# Patient Record
Sex: MALE | Race: BLACK OR AFRICAN AMERICAN | NOT HISPANIC OR LATINO | Employment: UNEMPLOYED | URBAN - METROPOLITAN AREA
[De-identification: names, ages, dates, MRNs, and addresses within clinical notes are randomized per-mention and may not be internally consistent; named-entity substitution may affect disease eponyms.]

---

## 2023-06-29 LAB
ANION GAP IN SER/PLAS: NORMAL
BASOPHILS (10*3/UL) IN BLOOD BY AUTOMATED COUNT: NORMAL
BASOPHILS/100 LEUKOCYTES IN BLOOD BY AUTOMATED COUNT: NORMAL
CALCIUM (MG/DL) IN SER/PLAS: NORMAL
CARBON DIOXIDE, TOTAL (MMOL/L) IN SER/PLAS: NORMAL
CHLORIDE (MMOL/L) IN SER/PLAS: NORMAL
CREATININE (MG/DL) IN SER/PLAS: NORMAL
EOSINOPHILS (10*3/UL) IN BLOOD BY AUTOMATED COUNT: NORMAL
EOSINOPHILS/100 LEUKOCYTES IN BLOOD BY AUTOMATED COUNT: NORMAL
ERYTHROCYTE DISTRIBUTION WIDTH (RATIO) BY AUTOMATED COUNT: NORMAL
ERYTHROCYTE MEAN CORPUSCULAR HEMOGLOBIN CONCENTRATION (G/DL) BY AUTOMATED: NORMAL
ERYTHROCYTE MEAN CORPUSCULAR VOLUME (FL) BY AUTOMATED COUNT: NORMAL
ERYTHROCYTES (10*6/UL) IN BLOOD BY AUTOMATED COUNT: NORMAL
GFR FEMALE: NORMAL
GFR MALE: NORMAL
GLUCOSE (MG/DL) IN SER/PLAS: NORMAL
HEMATOCRIT (%) IN BLOOD BY AUTOMATED COUNT: NORMAL
HEMOGLOBIN (G/DL) IN BLOOD: NORMAL
IMMATURE GRANULOCYTES/100 LEUKOCYTES IN BLOOD BY AUTOMATED COUNT: NORMAL
LEUKOCYTES (10*3/UL) IN BLOOD BY AUTOMATED COUNT: NORMAL
LYMPHOCYTES (10*3/UL) IN BLOOD BY AUTOMATED COUNT: NORMAL
LYMPHOCYTES/100 LEUKOCYTES IN BLOOD BY AUTOMATED COUNT: NORMAL
MANUAL DIFFERENTIAL Y/N: NORMAL
MONOCYTES (10*3/UL) IN BLOOD BY AUTOMATED COUNT: NORMAL
MONOCYTES/100 LEUKOCYTES IN BLOOD BY AUTOMATED COUNT: NORMAL
NEUTROPHILS (10*3/UL) IN BLOOD BY AUTOMATED COUNT: NORMAL
NEUTROPHILS/100 LEUKOCYTES IN BLOOD BY AUTOMATED COUNT: NORMAL
NRBC (PER 100 WBCS) BY AUTOMATED COUNT: NORMAL
PLATELETS (10*3/UL) IN BLOOD AUTOMATED COUNT: NORMAL
POTASSIUM (MMOL/L) IN SER/PLAS: NORMAL
SODIUM (MMOL/L) IN SER/PLAS: NORMAL
UREA NITROGEN (MG/DL) IN SER/PLAS: NORMAL

## 2023-07-02 LAB
GRAM STAIN: ABNORMAL
TISSUE/WOUND CULTURE/SMEAR: ABNORMAL

## 2023-08-04 ENCOUNTER — HOSPITAL ENCOUNTER (OUTPATIENT)
Dept: DATA CONVERSION | Facility: HOSPITAL | Age: 58
Discharge: HOME | End: 2023-08-04
Payer: MEDICAID

## 2023-08-04 DIAGNOSIS — S31.109D UNSPECIFIED OPEN WOUND OF ABDOMINAL WALL, UNSPECIFIED QUADRANT WITHOUT PENETRATION INTO PERITONEAL CAVITY, SUBSEQUENT ENCOUNTER: ICD-10-CM

## 2023-08-04 LAB — ETHANOL (MG/DL) IN SER/PLAS: 122 MG/DL

## 2023-09-25 RX ORDER — SERTRALINE HYDROCHLORIDE 50 MG/1
TABLET, FILM COATED ORAL
Status: ON HOLD | COMMUNITY
Start: 2023-07-30 | End: 2024-02-03 | Stop reason: WASHOUT

## 2023-09-25 RX ORDER — OMEPRAZOLE 20 MG/1
CAPSULE, DELAYED RELEASE ORAL
Status: ON HOLD | COMMUNITY
Start: 2023-06-14 | End: 2024-02-03 | Stop reason: WASHOUT

## 2023-09-25 RX ORDER — MEGESTROL ACETATE 40 MG/ML
SUSPENSION ORAL
Status: ON HOLD | COMMUNITY
Start: 2023-02-21 | End: 2024-02-03 | Stop reason: WASHOUT

## 2023-09-25 RX ORDER — MIDODRINE HYDROCHLORIDE 10 MG/1
TABLET ORAL
Status: ON HOLD | COMMUNITY
Start: 2023-07-30 | End: 2024-02-03 | Stop reason: WASHOUT

## 2023-09-25 RX ORDER — LIDOCAINE 50 MG/G
PATCH TOPICAL
Status: ON HOLD | COMMUNITY
Start: 2023-07-27 | End: 2024-02-03 | Stop reason: WASHOUT

## 2023-09-25 RX ORDER — QUETIAPINE FUMARATE 25 MG/1
TABLET, FILM COATED ORAL
Status: ON HOLD | COMMUNITY
Start: 2023-03-24 | End: 2024-02-03 | Stop reason: WASHOUT

## 2023-09-25 RX ORDER — GABAPENTIN 100 MG/1
CAPSULE ORAL
Status: ON HOLD | COMMUNITY
Start: 2023-07-31 | End: 2024-02-03 | Stop reason: WASHOUT

## 2023-09-25 RX ORDER — OLANZAPINE 5 MG/1
10 TABLET ORAL NIGHTLY
Status: ON HOLD | COMMUNITY
Start: 2023-07-30 | End: 2024-02-03 | Stop reason: WASHOUT

## 2023-09-25 RX ORDER — TAMSULOSIN HYDROCHLORIDE 0.4 MG/1
CAPSULE ORAL
Status: ON HOLD | COMMUNITY
Start: 2023-07-06 | End: 2024-02-03 | Stop reason: WASHOUT

## 2023-09-25 RX ORDER — MIRTAZAPINE 7.5 MG/1
TABLET, FILM COATED ORAL
Status: ON HOLD | COMMUNITY
Start: 2023-07-25 | End: 2024-02-03 | Stop reason: WASHOUT

## 2023-09-25 RX ORDER — AMMONIUM LACTATE 12 G/100G
LOTION TOPICAL
Status: ON HOLD | COMMUNITY
Start: 2023-07-05 | End: 2024-02-03 | Stop reason: WASHOUT

## 2023-09-25 RX ORDER — SILDENAFIL 100 MG/1
100 TABLET, FILM COATED ORAL
Status: ON HOLD | COMMUNITY
Start: 2022-10-22 | End: 2024-02-03 | Stop reason: WASHOUT

## 2023-09-30 NOTE — H&P
History of Present Illness:   History Present Illness   Reason for surgery: Abdominal wound split thickness  skin graft   HPI:    Bjorn Cedeno is a 57 year old Male With history of COPD, lung nodules, bronchiectasis, prostate cancer and recently multiple admissions for abdominal pain/small  bowel obstruction.  Initially presented on January 12 with abdominal pain and was admitted to the acute care surgery service with plan for nonoperative management and NG tube decompression.  He left the hospital AMA prior to resolution of his bowel obstruction.   He returned 3 days later with worsening abdominal pain and was taken urgently to the operating room for exploratory laparotomy, lysis of adhesions, small bowel resection and primary anastomosis.  His recovery was complicated by hypotension requiring  ICU admission.  He again left AMA on January 19.  Unfortunately returned to the emergency department with acute worsening of his abdominal pain.  CT scan of the abdomen revealed free air. He was urgently taken to the OR on 1/20 for exploratory laparotomy,  abdominal washout, EGD. Patient had 800ml of old blood evacuated from abdomen, bowel appeared viable. Negative leak test of anastomosis. Pt. returned to OR on 1/28 for ex-lap, small bowel resection, washout, and lysis of adhesion. Pt. again back to OR  on 1/30 for wash out and wound vac placement. Pt. then returned once again to OR on 2/1 for wash out reanastomosis and temporary abdominal closure. Returned to OR on 2/3 for fascial closure with phasix mesh. Patient began having intermittent fevers with  no associated clinical symptoms. A repeat CT A/P was completed on 2/7 which showed no acute findings. At that time patient was also pan cultured. Blood cultures found to be growing coga negative staph likely d/t PICC line which was removed on 2/8. Corynebacterium  striatum also noted in blood cultures which could be a contaminant vs PICC line. Infectious Disease  consulted and patient being treated with IV Vancomycin with a stop date of 2/15.   Wound continued to be managed with wound vac therapy. Patient started on a diet for which he tolerated well however poor appetite and therefore started on supplements along with a appetite stimulant. Patient continued to have poor PO intake, a Dobbhoff  was placed for additional nutritional support. He tolerated goal tube feeds and was transitioned to nocturnal tube feeds. Patient's dobbhoff may be removed once taking in adequate nutritional requirements.   Patient developed intermittent fevers after PICC line was removed. ID reconsulted. Started on Zosyn. No source identified based on clinical exam or CT. Sacral wound reassessed and was debrided at bedside but no bony involvement. He remained afebrile on  Zosyn and patient to complete a course of antibiotics for 10 days. Prior to discharge, he was transitioned to Augmentin with an end date of 2/26. Patient is here today for split thickness skin grafting of his abdominal wound.    Allergies:        Allergies:  ·  ibuprofen : Unknown    Home Medication Review:   Home Medications Reviewed: yes     Impression/Procedure:   ·  Impression and Planned Procedure: Abdominal wound split thickness skin grafting       ERAS (Enhanced Recovery After Surgery)   ·  ERAS Patient: no       Physical Exam by System     Constitutional: awake/alert/oriented x3, no distress,  alert and cooperative   Eyes: EOMI, clear sclera   ENMT: mucous membranes moist, no apparent injury,  no lesions seen   Head/Neck: Neck supple, no apparent injury   Respiratory/Thorax: unlabored respirations, on room  air   Cardiovascular: RRR   Gastrointestinal: Nondistended, soft, non-tender.  superficial abdominal wound with red/pink healthy granulation tissue   Genitourinary: Voiding independently   Musculoskeletal: ROM intact, no joint swelling, normal  strength   Extremities: normal extremities, no cyanosis edema,  contusions or  wounds, no clubbing   Neurological: alert and oriented x3, intact senses,  motor, response and reflexes, normal strength   Psychological: Appropriate mood and behavior   Skin: Warm and dry, no lesions, no rashes     Consent:   COVID-19 Consent   ·  COVID-19 Risk Consent Surgeon has reviewed key risks related to the risk of tia COVID-19 and if they contract COVID-19 what the risks are.     Electronic Signatures for Addendum Section:   Osmel Montejo) (Signed Addendum 04-Aug-2023 13:33)   Patient reported that he drank beer in the morning before surgery. Blood alcohol test was done and results were above the consent-ability levels. Surgery therefore  will be rescheduled on another day.     Electronic Signatures:  Marilu Gonzales (APRN-CNP)  (Signed 04-Aug-2023 06:43)   Authored: History of Present Illness, Allergies, Home  Medication Review, Impression/Procedure, ERAS, Physical Exam, Consent, Note Completion      Last Updated: 04-Aug-2023 13:33 by Osmel Montejo)

## 2023-09-30 NOTE — H&P
History of Present Illness:   History Present Illness   Reason for surgery: Abdominal split thickness skin  grafting   HPI:    Bjorn Cedeno is a 57 year old Male With history of COPD, lung nodules, bronchiectasis, prostate cancer and recently multiple admissions for abdominal pain/small  bowel obstruction.  Initially presented on January 12 with abdominal pain and was admitted to the acute care surgery service with plan for nonoperative management and NG tube decompression.  He left the hospital AMA prior to resolution of his bowel obstruction.   He returned 3 days later with worsening abdominal pain and was taken urgently to the operating room for exploratory laparotomy, lysis of adhesions, small bowel resection and primary anastomosis.  His recovery was complicated by hypotension requiring  ICU admission.  He subsequently recovered appropriately and was transferred to the regular nursing floor.  He again left AMA on January 19.  Unfortunately returned to the emergency department with acute worsening of his abdominal pain.  CT scan of the  abdomen revealed free air. He was urgently taken to the OR on 1/20 for exploratory laparotomy, abdominal washout, EGD. Patient had 800ml of old blood evacuated from abdomen, bowel appeared viable. Negative leak test of anastomosis. Pt. returned to OR  on 1/28 for ex-lap, small bowel resection, washout, and lysis of adhesion. Pt. again back to OR on 1/30 for wash out and wound vac placement. Pt. then returned once again to OR on 2/1 for wash out reanastomosis and temporary abdominal closure. Returned  to OR on 2/3 for fascial closure with phasix mesh. Patient began having intermittent fevers with no associated clinical symptoms. A repeat CT A/P was completed on 2/7 which showed no acute findings. At that time patient was also pan cultured. Blood cultures  found to be growing coga negative staph likely d/t PICC line which was removed on 2/8. Corynebacterium striatum also  noted in blood cultures which could be a contaminant vs PICC line. Infectious Disease consulted and patient being treated with IV Vancomycin  with a stop date of 2/15.   Wound continued to be managed with wound vac therapy. Patient started on a diet for which he tolerated well however poor appetite and therefore started on supplements along with a appetite stimulant. Patient continued to have poor PO intake, a Dobbhoff  was placed for additional nutritional support. He tolerated goal tube feeds and was transitioned to nocturnal tube feeds. Patient's dobbhoff may be removed once taking in adequate nutritional requirements.   Patient developed intermittent fevers after PICC line was removed. ID reconsulted. Started on Zosyn. No source identified based on clinical exam or CT. Sacral wound reassessed and was debrided at bedside but no bony involvement. He remained afebrile on  Zosyn and patient to complete a course of antibiotics for 10 days. Prior to discharge, he was transitioned to Augmentin with an end date of 2/26. Patient is here today for split thickness skin grafting of his abdominal wound.    Past Medical History:        Surg History:   Shock: Onset Date: 16-Jan-2023    Allergies:        Allergies:  ·  ibuprofen : Unknown    Home Medication Review:   Home Medications Reviewed: yes     Impression/Procedure:   ·  Impression and Planned Procedure: Abdominal split thickness skin graft       ERAS (Enhanced Recovery After Surgery)   ·  ERAS Patient: no       Physical Exam by System     Constitutional: Well developed, awake/alert/oriented  x3, no distress, alert and cooperative   Eyes: PERRL, EOMI, clear sclera   ENMT: mucous membranes moist, no apparent injury,  no lesions seen   Head/Neck: Neck supple, no apparent injury, thyroid  without mass or tenderness, No JVD, trachea midline, no bruits   Respiratory/Thorax: unlabored respirations, on room  air   Cardiovascular: Regular, rate and rhythm, no murmurs,  2+ equal  pulses of the extremities, normal S 1and S 2   Gastrointestinal: Superficial abdominal wound   Genitourinary: Voiding independently   Musculoskeletal: ROM intact, no joint swelling, normal  strength   Extremities: normal extremities, no cyanosis edema,  contusions or wounds, no clubbing   Neurological: alert and oriented x3, intact senses,  motor, response and reflexes, normal strength   Psychological: Appropriate mood and behavior   Skin: Warm and dry, no lesions, no rashes     Consent:   COVID-19 Consent   ·  COVID-19 Risk Consent Surgeon has reviewed key risks related to the risk of tia COVID-19 and if they contract COVID-19 what the risks are.     Electronic Signatures for Addendum Section:   Osmel Montejo) (Signed Addendum 19-Jul-2023 10:24)   Patient ddi not show up for surgery and the his surgery was therefore canceled.     Electronic Signatures:  Blanca Ng (PA (PHYSICIAN))   (Signed 19-Jul-2023 06:53)   Authored: History of Present Illness, Past Medical History, Allergies, Home Medication Review, Impression/Procedure, Physical Exam, ERAS, Consent, Note Completion    Last Updated: 19-Jul-2023 10:25 by Osmel Montejo)

## 2023-09-30 NOTE — H&P
History of Present Illness:   History Present Illness:  Reason for surgery: Abdominal split thickness skin  grafting   HPI:    Bjorn Cedeno is a 57 year old Male With history of COPD, lung nodules, bronchiectasis, prostate cancer and recently multiple admissions for abdominal pain/small  bowel obstruction.  Initially presented on January 12 with abdominal pain and was admitted to the acute care surgery service with plan for nonoperative management and NG tube decompression.  He left the hospital AMA prior to resolution of his bowel obstruction.   He returned 3 days later with worsening abdominal pain and was taken urgently to the operating room for exploratory laparotomy, lysis of adhesions, small bowel resection and primary anastomosis.  His recovery was complicated by hypotension requiring  ICU admission.  He subsequently recovered appropriately and was transferred to the regular nursing floor.  He again left AMA on January 19.  Unfortunately returned to the emergency department with acute worsening of his abdominal pain.  CT scan of the  abdomen revealed free air. He was urgently taken to the OR on 1/20 for exploratory laparotomy, abdominal washout, EGD. Patient had 800ml of old blood evacuated from abdomen, bowel appeared viable. Negative leak test of anastomosis. Pt. returned to OR  on 1/28 for ex-lap, small bowel resection, washout, and lysis of adhesion. Pt. again back to OR on 1/30 for wash out and wound vac placement. Pt. then returned once again to OR on 2/1 for wash out reanastomosis and temporary abdominal closure. Returned  to OR on 2/3 for fascial closure with phasix mesh. Patient began having intermittent fevers with no associated clinical symptoms. A repeat CT A/P was completed on 2/7 which showed no acute findings. At that time patient was also pan cultured. Blood cultures  found to be growing coga negative staph likely d/t PICC line which was removed on 2/8. Corynebacterium striatum also  noted in blood cultures which could be a contaminant vs PICC line. Infectious Disease consulted and patient being treated with IV Vancomycin  with a stop date of 2/15.   Wound continued to be managed with wound vac therapy. Patient started on a diet for which he tolerated well however poor appetite and therefore started on supplements along with a appetite stimulant. Patient continued to have poor PO intake, a Dobbhoff  was placed for additional nutritional support. He tolerated goal tube feeds and was transitioned to nocturnal tube feeds. Patient's dobbhoff may be removed once taking in adequate nutritional requirements.   Patient developed intermittent fevers after PICC line was removed. ID reconsulted. Started on Zosyn. No source identified based on clinical exam or CT. Sacral wound reassessed and was debrided at bedside but no bony involvement. He remained afebrile on  Zosyn and patient to complete a course of antibiotics for 10 days. Prior to discharge, he was transitioned to Augmentin with an end date of 2/26. Patient is here today for split thickness skin grafting of his abdominal wound.    Allergies:        Allergies:  ·  ibuprofen : Unknown    Home Medication Review:   Home Medications Reviewed: yes     Impression/Procedure:   ·  Impression and Planned Procedure: Abdominal split thickness skin graft       ERAS (Enhanced Recovery After Surgery):  ·  ERAS Patient: no       Physical Exam by System:    Constitutional: awake/alert/oriented x3, no distress,  alert and cooperative   Eyes: EOMI, clear sclera   ENMT: mucous membranes moist, no apparent injury,  no lesions seen   Head/Neck: Neck supple, no apparent injury   Respiratory/Thorax: unlabored respirations, on room  air   Cardiovascular: Regular, rate and rhythm   Gastrointestinal: Superficial abdominal wound   Genitourinary: Voiding independently   Musculoskeletal: ROM intact, no joint swelling, normal  strength   Extremities: normal extremities, no  cyanosis edema,  contusions or wounds, no clubbing   Neurological: alert and oriented x3, intact senses,  motor, response and reflexes, normal strength   Psychological: Appropriate mood and behavior   Skin: Warm and dry, no lesions, no rashes     Consent:   COVID-19 Consent:  ·  COVID-19 Risk Consent Surgeon has reviewed key risks related to the risk of tia COVID-19 and if they contract COVID-19 what the risks are.       Electronic Signatures:  Marilu Gonzales (APRN-CNP)  (Signed 20-Jun-2023 06:03)   Authored: History of Present Illness, Allergies, Home  Medication Review, Impression/Procedure, ERAS, Physical Exam, Consent, Note Completion      Last Updated: 20-Jun-2023 06:03 by Marilu Gonzales (APRN-CNP)

## 2023-10-02 ENCOUNTER — APPOINTMENT (OUTPATIENT)
Dept: PLASTIC SURGERY | Facility: CLINIC | Age: 58
End: 2023-10-02
Payer: MEDICAID

## 2023-12-19 ENCOUNTER — CLINICAL SUPPORT (OUTPATIENT)
Dept: SURGERY | Facility: CLINIC | Age: 58
End: 2023-12-19
Payer: MEDICAID

## 2023-12-19 VITALS
SYSTOLIC BLOOD PRESSURE: 137 MMHG | BODY MASS INDEX: 22.07 KG/M2 | RESPIRATION RATE: 16 BRPM | DIASTOLIC BLOOD PRESSURE: 92 MMHG | HEART RATE: 82 BPM | WEIGHT: 172 LBS | HEIGHT: 74 IN

## 2023-12-19 DIAGNOSIS — Z51.89 ENCOUNTER FOR WOUND CARE: ICD-10-CM

## 2023-12-19 DIAGNOSIS — K56.609 SBO (SMALL BOWEL OBSTRUCTION) (MULTI): Primary | ICD-10-CM

## 2023-12-19 PROCEDURE — 99215 OFFICE O/P EST HI 40 MIN: CPT | Performed by: PHYSICIAN ASSISTANT
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
## 2024-01-08 ENCOUNTER — HOSPITAL ENCOUNTER (EMERGENCY)
Facility: HOSPITAL | Age: 59
Discharge: HOME | End: 2024-01-08
Attending: EMERGENCY MEDICINE
Payer: MEDICAID

## 2024-01-08 VITALS
HEART RATE: 86 BPM | OXYGEN SATURATION: 98 % | TEMPERATURE: 97.2 F | SYSTOLIC BLOOD PRESSURE: 132 MMHG | RESPIRATION RATE: 16 BRPM | DIASTOLIC BLOOD PRESSURE: 92 MMHG

## 2024-01-08 DIAGNOSIS — T14.8XXA SURGICAL WOUND PRESENT: Primary | ICD-10-CM

## 2024-01-08 LAB
ALBUMIN SERPL BCP-MCNC: 4.2 G/DL (ref 3.4–5)
ALP SERPL-CCNC: 62 U/L (ref 33–136)
ALT SERPL W P-5'-P-CCNC: 40 U/L (ref 10–52)
ANION GAP SERPL CALC-SCNC: 11 MMOL/L (ref 10–20)
AST SERPL W P-5'-P-CCNC: 45 U/L (ref 9–39)
BASOPHILS # BLD AUTO: 0.04 X10*3/UL (ref 0–0.1)
BASOPHILS NFR BLD AUTO: 1 %
BILIRUB SERPL-MCNC: 0.3 MG/DL (ref 0–1.2)
BUN SERPL-MCNC: 15 MG/DL (ref 6–23)
CALCIUM SERPL-MCNC: 9.3 MG/DL (ref 8.6–10.6)
CHLORIDE SERPL-SCNC: 105 MMOL/L (ref 98–107)
CO2 SERPL-SCNC: 27 MMOL/L (ref 21–32)
CREAT SERPL-MCNC: 1.09 MG/DL (ref 0.5–1.3)
EGFRCR SERPLBLD CKD-EPI 2021: 78 ML/MIN/1.73M*2
EOSINOPHIL # BLD AUTO: 0.18 X10*3/UL (ref 0–0.7)
EOSINOPHIL NFR BLD AUTO: 4.6 %
ERYTHROCYTE [DISTWIDTH] IN BLOOD BY AUTOMATED COUNT: 11.7 % (ref 11.5–14.5)
GLUCOSE SERPL-MCNC: 109 MG/DL (ref 74–99)
HCT VFR BLD AUTO: 43.1 % (ref 41–52)
HGB BLD-MCNC: 15 G/DL (ref 13.5–17.5)
IMM GRANULOCYTES # BLD AUTO: 0.02 X10*3/UL (ref 0–0.7)
IMM GRANULOCYTES NFR BLD AUTO: 0.5 % (ref 0–0.9)
LYMPHOCYTES # BLD AUTO: 1.21 X10*3/UL (ref 1.2–4.8)
LYMPHOCYTES NFR BLD AUTO: 30.7 %
MCH RBC QN AUTO: 29.8 PG (ref 26–34)
MCHC RBC AUTO-ENTMCNC: 34.8 G/DL (ref 32–36)
MCV RBC AUTO: 86 FL (ref 80–100)
MONOCYTES # BLD AUTO: 0.52 X10*3/UL (ref 0.1–1)
MONOCYTES NFR BLD AUTO: 13.2 %
NEUTROPHILS # BLD AUTO: 1.97 X10*3/UL (ref 1.2–7.7)
NEUTROPHILS NFR BLD AUTO: 50 %
NRBC BLD-RTO: 0 /100 WBCS (ref 0–0)
PLATELET # BLD AUTO: 189 X10*3/UL (ref 150–450)
POTASSIUM SERPL-SCNC: 3.9 MMOL/L (ref 3.5–5.3)
PROT SERPL-MCNC: 7.9 G/DL (ref 6.4–8.2)
RBC # BLD AUTO: 5.04 X10*6/UL (ref 4.5–5.9)
SODIUM SERPL-SCNC: 139 MMOL/L (ref 136–145)
WBC # BLD AUTO: 3.9 X10*3/UL (ref 4.4–11.3)

## 2024-01-08 PROCEDURE — 84075 ASSAY ALKALINE PHOSPHATASE: CPT | Performed by: STUDENT IN AN ORGANIZED HEALTH CARE EDUCATION/TRAINING PROGRAM

## 2024-01-08 PROCEDURE — 85025 COMPLETE CBC W/AUTO DIFF WBC: CPT | Performed by: EMERGENCY MEDICINE

## 2024-01-08 PROCEDURE — 99283 EMERGENCY DEPT VISIT LOW MDM: CPT

## 2024-01-08 PROCEDURE — 80053 COMPREHEN METABOLIC PANEL: CPT | Performed by: EMERGENCY MEDICINE

## 2024-01-08 PROCEDURE — 99284 EMERGENCY DEPT VISIT MOD MDM: CPT | Performed by: EMERGENCY MEDICINE

## 2024-01-08 PROCEDURE — 85025 COMPLETE CBC W/AUTO DIFF WBC: CPT | Performed by: STUDENT IN AN ORGANIZED HEALTH CARE EDUCATION/TRAINING PROGRAM

## 2024-01-08 ASSESSMENT — COLUMBIA-SUICIDE SEVERITY RATING SCALE - C-SSRS
2. HAVE YOU ACTUALLY HAD ANY THOUGHTS OF KILLING YOURSELF?: NO
6. HAVE YOU EVER DONE ANYTHING, STARTED TO DO ANYTHING, OR PREPARED TO DO ANYTHING TO END YOUR LIFE?: NO
1. IN THE PAST MONTH, HAVE YOU WISHED YOU WERE DEAD OR WISHED YOU COULD GO TO SLEEP AND NOT WAKE UP?: NO

## 2024-01-08 NOTE — ED TRIAGE NOTES
Pt says he had abd hernia sx 1 year ago, wound still open, recently incarcerated for 4 months, hasn't had follow up

## 2024-01-08 NOTE — DISCHARGE INSTRUCTIONS
Contact the [facility] Today  For an appointment with one of our wound treatment specialists, call [phone].    WOUND CARE  - Take daily showers  - Allow warm, soapy water to wash over wound  - Do not scrub at the wound  - When out of the shower, gently pat the wound dry.  - Do not apply lotions, ointments or creams  - Avoid soaking in bodies of water (bathtub, hot tubs, pools, lakes, etc) until wound is completely healed  - continue to apply xeroform over the top two open portions of the wound to help with sticking to dressing   - no concerns of infection or fistula at this time   - continue to cleanse the wound with normal saline  - please encourage patient to shower daily   - after showering please allow wound to dry completely, okay to stay open to air until you see wound care the next day   - The wound needs to be open to air without abdominal binder, wound dressings, ect. For at least a few hours a day

## 2024-01-08 NOTE — ED PROVIDER NOTES
"HPI   Chief Complaint   Patient presents with    Wound Check       Patient is a 58-year-old male with past medical history of SBO (s/p ex lap with subsequent anastomosis, wound washout, mesh placement and abdominal closure in Jan-Feb 2023), reported prostate cancer being treated with reported low-dose brachytherapy (no documented treatment but patient describes placement of 50 \"helen\" in his prostate) presenting with concern for continued weeping from abdominal wound.  Patient endorses he was getting adequate wound care while he was incarcerated recently but since discharging to [facility] he has not had his wound care cream or adequate sterile dressings.  Patient concerned that wound has continued to weep clear to serosanguineous fluid.  Patient endorses he was scheduled to have surgical repair of the wound with grafting on a follow-up appointment but has not had this performed yet.  Patient otherwise does not endorse warmth to the wound and denies fevers, chills, malaise, cough, congestion, rhinorrhea or other infectious symptoms.  Patient endorses he is still having regular bowel movements but does endorse occasionally using laxative.  Patient otherwise endorses urination is still complicated by his prostate treatment but endorses no recent changes.                          No data recorded                Patient History   No past medical history on file.  Past Surgical History:   Procedure Laterality Date    CT ANGIO NECK  8/13/2023    CT ANGIO NECK 8/13/2023     No family history on file.  Social History     Tobacco Use    Smoking status: Not on file    Smokeless tobacco: Not on file   Substance Use Topics    Alcohol use: Not on file    Drug use: Not on file       Physical Exam   ED Triage Vitals [01/08/24 1002]   Temp Heart Rate Resp BP   36.2 °C (97.2 °F) 86 16 (!) 132/92      SpO2 Temp src Heart Rate Source Patient Position   98 % -- -- --      BP Location FiO2 (%)     -- --       Physical Exam  Vitals " and nursing note reviewed.   Constitutional:       General: He is not in acute distress.     Appearance: He is well-developed.   HENT:      Head: Normocephalic and atraumatic.   Eyes:      Conjunctiva/sclera: Conjunctivae normal.   Cardiovascular:      Rate and Rhythm: Normal rate and regular rhythm.      Heart sounds: No murmur heard.  Pulmonary:      Effort: Pulmonary effort is normal. No respiratory distress.      Breath sounds: Normal breath sounds.   Abdominal:      Palpations: Abdomen is soft.      Tenderness: There is no abdominal tenderness.   Musculoskeletal:         General: No swelling.      Cervical back: Neck supple.   Skin:     General: Skin is warm and dry.      Capillary Refill: Capillary refill takes less than 2 seconds.      Comments: 3 x 2 cm midline wound to abdomen.  Coloration is pinkish consistent with granulation tissue.  Scant soaking of dressing with serosanguineous fluid.  Significant scarring to surrounding area but no appreciable erythema or warmth.  Reported tenderness to palpation just inferior and lateral to patient's right of wound.  Palpable fibrotic tissue in the surrounding area but no obvious fluctuance or focal tenderness.    Additional area of abrasion in sacral region but no breakdown of skin, no warmth, no erythema   Neurological:      Mental Status: He is alert.   Psychiatric:         Mood and Affect: Mood normal.         ED Course & MDM   ED Course as of 01/08/24 1438   Mon Jan 08, 2024   1341 Patient presented to the emergency department with a chief concern of wound check.  Patient is 1 year out from hernia repair complicated by hypotension with prolonged SICU stay who is presenting to the emergency department for assistance with wound care.  On my physical examination the patient has a sacral pressure sore which appears to be well-healing, no signs of infection.  Additionally the patient has a surgical scar on the abdomen and with granulation tissue, no signs of  "purulence or infection.  Patient denies any recent fevers, denies any abdominal pain, nausea vomiting/diarrhea and reports that he has been having consistent bowel movements without difficulty.  Given patient's reassuring examination I do not believe that any CT imaging is warranted at this time.  Basic labs are obtained and patient appears well-nourished, no signs of infection, no leukocytosis.  Resident physician assist patient with instructions for wound care, gave patient follow-up with wound care, additionally gave patient instructions for follow-up with surgery clinic. [BN]      ED Course User Index  [BN] Yahir Hamm MD         Diagnoses as of 01/08/24 1438   Surgical wound present       Medical Decision Making  Patient is a 58-year-old male with past medical history of SBO (s/p ex lap with subsequent anastomosis, wound washout, mesh placement and abdominal closure in Jan-Feb 2023), reported prostate cancer being treated with reported low-dose brachytherapy (no documented treatment but patient describes placement of 50 \"helen\" in his prostate) presenting with concern for continued weeping from abdominal wound.  Wound site consistent with chronic wound with appropriate granulation tissue and no evidence of local or systemic infection.  Patient with white count of 3.9 with no neutropenia and only current cancer treatment being low-dose brachytherapy not felt to be immunosuppressive.  Patient does have mild tenderness but does not endorse complaint of abdominal pain and otherwise has generally benign abdominal exam and CT of abdomen deferred at this time.  Patient otherwise having regular bowel movements reducing concern for new obstruction.  Patient's wound dressed with Xeroform and abdominal pad in alignment with prior recommended treatment from [facility].  Patient provided instructions for wound care and was walked through these instructions.  Patient provided follow-up phone numbers for wound care, " general surgery and plastic surgery to coordinate future wound care and possible grafting procedure of the wound as previously discussed.  Return precautions discussed with patient and patient discharged home.    Patient seen and discussed with Dr. Hamm and Dr. Raine Lambert MD, PhD  Emergency Medicine PGY2          Procedure  Procedures     Pilo Lambert MD  Resident  01/08/24 0125

## 2024-02-02 ENCOUNTER — APPOINTMENT (OUTPATIENT)
Dept: RADIOLOGY | Facility: HOSPITAL | Age: 59
End: 2024-02-02
Payer: MEDICAID

## 2024-02-02 ENCOUNTER — HOSPITAL ENCOUNTER (OUTPATIENT)
Facility: HOSPITAL | Age: 59
Setting detail: OBSERVATION
Discharge: HOME | End: 2024-02-08
Attending: EMERGENCY MEDICINE | Admitting: STUDENT IN AN ORGANIZED HEALTH CARE EDUCATION/TRAINING PROGRAM
Payer: MEDICAID

## 2024-02-02 DIAGNOSIS — R42 VERTIGO: Primary | ICD-10-CM

## 2024-02-02 DIAGNOSIS — I10 PRIMARY HYPERTENSION: ICD-10-CM

## 2024-02-02 DIAGNOSIS — R93.89 ABNORMAL COMPUTED TOMOGRAPHY ANGIOGRAPHY (CTA) OF NECK: ICD-10-CM

## 2024-02-02 LAB
ALBUMIN SERPL BCP-MCNC: 4.4 G/DL (ref 3.4–5)
ALP SERPL-CCNC: 73 U/L (ref 33–136)
ALT SERPL W P-5'-P-CCNC: 18 U/L (ref 10–52)
ANION GAP SERPL CALC-SCNC: 11 MMOL/L (ref 10–20)
AST SERPL W P-5'-P-CCNC: 26 U/L (ref 9–39)
BASOPHILS # BLD AUTO: 0.02 X10*3/UL (ref 0–0.1)
BASOPHILS NFR BLD AUTO: 0.5 %
BILIRUB SERPL-MCNC: 0.5 MG/DL (ref 0–1.2)
BUN SERPL-MCNC: 12 MG/DL (ref 6–23)
CALCIUM SERPL-MCNC: 9.6 MG/DL (ref 8.6–10.6)
CHLORIDE SERPL-SCNC: 104 MMOL/L (ref 98–107)
CO2 SERPL-SCNC: 30 MMOL/L (ref 21–32)
CREAT SERPL-MCNC: 1.03 MG/DL (ref 0.5–1.3)
EGFRCR SERPLBLD CKD-EPI 2021: 83 ML/MIN/1.73M*2
EOSINOPHIL # BLD AUTO: 0.14 X10*3/UL (ref 0–0.7)
EOSINOPHIL NFR BLD AUTO: 3.8 %
ERYTHROCYTE [DISTWIDTH] IN BLOOD BY AUTOMATED COUNT: 11.8 % (ref 11.5–14.5)
GLUCOSE SERPL-MCNC: 86 MG/DL (ref 74–99)
HCT VFR BLD AUTO: 45.7 % (ref 41–52)
HGB BLD-MCNC: 16 G/DL (ref 13.5–17.5)
IMM GRANULOCYTES # BLD AUTO: 0.01 X10*3/UL (ref 0–0.7)
IMM GRANULOCYTES NFR BLD AUTO: 0.3 % (ref 0–0.9)
LYMPHOCYTES # BLD AUTO: 1.3 X10*3/UL (ref 1.2–4.8)
LYMPHOCYTES NFR BLD AUTO: 35 %
MCH RBC QN AUTO: 30.7 PG (ref 26–34)
MCHC RBC AUTO-ENTMCNC: 35 G/DL (ref 32–36)
MCV RBC AUTO: 88 FL (ref 80–100)
MONOCYTES # BLD AUTO: 0.46 X10*3/UL (ref 0.1–1)
MONOCYTES NFR BLD AUTO: 12.4 %
NEUTROPHILS # BLD AUTO: 1.78 X10*3/UL (ref 1.2–7.7)
NEUTROPHILS NFR BLD AUTO: 48 %
NRBC BLD-RTO: 0 /100 WBCS (ref 0–0)
PLATELET # BLD AUTO: 208 X10*3/UL (ref 150–450)
POTASSIUM SERPL-SCNC: 4.7 MMOL/L (ref 3.5–5.3)
PROT SERPL-MCNC: 8.3 G/DL (ref 6.4–8.2)
RBC # BLD AUTO: 5.21 X10*6/UL (ref 4.5–5.9)
SODIUM SERPL-SCNC: 140 MMOL/L (ref 136–145)
WBC # BLD AUTO: 3.7 X10*3/UL (ref 4.4–11.3)

## 2024-02-02 PROCEDURE — 99283 EMERGENCY DEPT VISIT LOW MDM: CPT | Performed by: PHYSICIAN ASSISTANT

## 2024-02-02 PROCEDURE — 85025 COMPLETE CBC W/AUTO DIFF WBC: CPT | Performed by: PHYSICIAN ASSISTANT

## 2024-02-02 PROCEDURE — 2500000001 HC RX 250 WO HCPCS SELF ADMINISTERED DRUGS (ALT 637 FOR MEDICARE OP): Performed by: STUDENT IN AN ORGANIZED HEALTH CARE EDUCATION/TRAINING PROGRAM

## 2024-02-02 PROCEDURE — 99221 1ST HOSP IP/OBS SF/LOW 40: CPT | Performed by: SURGERY

## 2024-02-02 PROCEDURE — 99285 EMERGENCY DEPT VISIT HI MDM: CPT | Performed by: PHYSICIAN ASSISTANT

## 2024-02-02 PROCEDURE — 84075 ASSAY ALKALINE PHOSPHATASE: CPT | Performed by: PHYSICIAN ASSISTANT

## 2024-02-02 PROCEDURE — 1210000001 HC SEMI-PRIVATE ROOM DAILY

## 2024-02-02 PROCEDURE — 99221 1ST HOSP IP/OBS SF/LOW 40: CPT | Performed by: STUDENT IN AN ORGANIZED HEALTH CARE EDUCATION/TRAINING PROGRAM

## 2024-02-02 PROCEDURE — 36415 COLL VENOUS BLD VENIPUNCTURE: CPT | Performed by: PHYSICIAN ASSISTANT

## 2024-02-02 PROCEDURE — 99285 EMERGENCY DEPT VISIT HI MDM: CPT | Mod: 25 | Performed by: EMERGENCY MEDICINE

## 2024-02-02 PROCEDURE — G0378 HOSPITAL OBSERVATION PER HR: HCPCS

## 2024-02-02 PROCEDURE — 70496 CT ANGIOGRAPHY HEAD: CPT | Performed by: RADIOLOGY

## 2024-02-02 PROCEDURE — 70498 CT ANGIOGRAPHY NECK: CPT

## 2024-02-02 PROCEDURE — 2550000001 HC RX 255 CONTRASTS: Mod: SE | Performed by: EMERGENCY MEDICINE

## 2024-02-02 PROCEDURE — 70498 CT ANGIOGRAPHY NECK: CPT | Performed by: RADIOLOGY

## 2024-02-02 PROCEDURE — 93880 EXTRACRANIAL BILAT STUDY: CPT

## 2024-02-02 RX ORDER — ATORVASTATIN CALCIUM 40 MG/1
40 TABLET, FILM COATED ORAL NIGHTLY
Status: DISCONTINUED | OUTPATIENT
Start: 2024-02-02 | End: 2024-02-08 | Stop reason: HOSPADM

## 2024-02-02 RX ORDER — POLYETHYLENE GLYCOL 3350 17 G/17G
17 POWDER, FOR SOLUTION ORAL DAILY
Status: DISCONTINUED | OUTPATIENT
Start: 2024-02-03 | End: 2024-02-08 | Stop reason: HOSPADM

## 2024-02-02 RX ORDER — NAPROXEN SODIUM 220 MG/1
81 TABLET, FILM COATED ORAL DAILY
Status: DISCONTINUED | OUTPATIENT
Start: 2024-02-03 | End: 2024-02-08 | Stop reason: HOSPADM

## 2024-02-02 RX ADMIN — ATORVASTATIN CALCIUM 40 MG: 40 TABLET, FILM COATED ORAL at 22:11

## 2024-02-02 RX ADMIN — IOHEXOL 80 ML: 350 INJECTION, SOLUTION INTRAVENOUS at 14:25

## 2024-02-02 ASSESSMENT — PAIN SCALES - GENERAL: PAINLEVEL_OUTOF10: 7

## 2024-02-02 ASSESSMENT — PAIN - FUNCTIONAL ASSESSMENT: PAIN_FUNCTIONAL_ASSESSMENT: 0-10

## 2024-02-02 ASSESSMENT — LIFESTYLE VARIABLES
HAVE PEOPLE ANNOYED YOU BY CRITICIZING YOUR DRINKING: NO
EVER FELT BAD OR GUILTY ABOUT YOUR DRINKING: NO
HAVE YOU EVER FELT YOU SHOULD CUT DOWN ON YOUR DRINKING: NO
EVER HAD A DRINK FIRST THING IN THE MORNING TO STEADY YOUR NERVES TO GET RID OF A HANGOVER: NO

## 2024-02-02 ASSESSMENT — PAIN DESCRIPTION - PAIN TYPE: TYPE: CHRONIC PAIN

## 2024-02-02 NOTE — CONSULTS
[facility]  ACUTE CARE SURGERY - HISTORY AND PHYSICAL / CONSULT    Patient Name: Bjorn Cedeno  MRN: [id number]  Admit Date: 202  : 1965  AGE: 58 y.o.   GENDER: male  ==============================================================================  TODAY'S ASSESSMENT AND PLAN OF CARE:  Bjorn Cedeno is a 58 y.o. male presenting with chronic abdominal wound and well known to the [facility] service. Patient last operation was 2023. (Surgery Hx listed below) He left AMA from the hospital twice following operations and was lost to follow up in the surgery clinic after an April appointment. He was evaluated by Plastics in  for a split thickness skin graft to cover the mesh. Surgery was scheduled for July however the patient did not show up for the operation. It was then offered to the patient again however his blood alcohol levels were high and he was rescheduled. He was then incarcerated at some point and was seen in [facility] clinic by myself on 23 in which I recommended him coming back for wound check on . Patient did not show up for appointment.   He has been seen by the ED here for the wound in early January and by [facility] on 23.     OR Course:  1/15: exploratory laparotomy, lysis of adhesions, small bowel resection (25cm) with anastomosis  : exploratory laparotomy, abdominal washout and EGD  : Exploratory laparotomy, lysis of adhesions, small bowel resection, application of Abthera vac  : re-exploration of abdomen, abdominal washout, abthera placement  : re-opening of recent laparotomy, primary small bowel anastomosis, and temporary abdominal closure  2/3: Abdominal closure, mesh placement        Today there is no concern for infection or need for skin graft. Wound is healing.     PLAN:  - WOUND CARE  - Take daily showers  - Allow warm, soapy water to wash over wound  - Do not scrub at the wound  - When out of the shower, gently  pat the wound dry.  - Do not apply lotions, ointments or creams  - Avoid soaking in bodies of water (bathtub, hot tubs, pools, lakes, etc) until wound is completely healed  - Continue to place clean ABD with tape over wound. Do not tape all 4 sides.     [facility] will sign off, please contact us with questions or concerns.     Edith Rene PA-C    ==============================================================================  CHIEF COMPLAINT/REASON FOR CONSULT:  Nonhealing chronic wound     PAST MEDICAL HISTORY:   PMH:   History reviewed. No pertinent past medical history.  Last menstrual period:     PSH:   Past Surgical History:   Procedure Laterality Date    CT ANGIO NECK  8/13/2023    CT ANGIO NECK 8/13/2023     FH:   No family history on file.  SOCIAL HISTORY:    Smoking:    Social History     Tobacco Use   Smoking Status Never   Smokeless Tobacco Never       Alcohol:    Social History     Substance and Sexual Activity   Alcohol Use Not Currently       Drug use: marijuana    MEDICATIONS:   Prior to Admission medications    Medication Sig Start Date End Date Taking? Authorizing Provider   acetaminophen (Tylenol) 325 mg tablet TAKE 2 TABLETS BY MOUTH EVERY 6 HOURS AS NEEDED FOR PAIN 7/26/23 7/25/24  Alexandre Albrecht MD   ammonium lactate (Lac-Hydrin) 12 % lotion  7/5/23   Historical Provider, MD   gabapentin (Neurontin) 100 mg capsule  7/31/23   Historical Provider, MD   lidocaine (Lidoderm) 5 % patch  7/27/23   Historical Provider, MD   lidocaine (Lidoderm) 5 % patch APPLY 1 PATCH TOPICALLY TO AFFECTED AREA ONCE DAILY. LEAVE IN PLACE FOR 12 HOURS, THEN REMOVE AND KEEP OFF FOR 12 HOURS.  Patient not taking: Reported on 12/19/2023 7/26/23 7/25/24  Alexandre Albrecht MD   megestrol 400 mg/10 mL (40 mg/mL) suspension  2/21/23   Historical Provider, MD   midodrine (Proamatine) 10 mg tablet  7/30/23   Historical Provider, MD   mirtazapine (Remeron) 7.5 mg tablet  7/25/23   Historical Provider, MD   OLANZapine (ZyPREXA) 2.5 mg  tablet  7/30/23   Historical Provider, MD   omeprazole (PriLOSEC) 20 mg DR capsule  6/14/23   Historical Provider, MD   pediatric multivit 152/D3/K (ABDEK MULTIVITAMIN ORAL)     Historical Provider, MD   QUEtiapine (SEROquel) 25 mg tablet  3/24/23   Historical Provider, MD   sertraline (Zoloft) 50 mg tablet  7/30/23   Historical Provider, MD   sildenafil (Viagra) 100 mg tablet Take 1 tablet (100 mg) by mouth.  TAKE 1 TABLET BY MOUTH ONCE DAILY AS NEEDED APPROXIAMTELY ONE HOUR BEFORE SEXUAL ACTIVITY. DO NOT USE MORE THAN ONE DOSE DAILY 10/22/22   Historical Provider, MD   tamsulosin (Flomax) 0.4 mg 24 hr capsule  7/6/23   Historical Provider, MD     ALLERGIES:   Allergies   Allergen Reactions    Ibuprofen Unknown       REVIEW OF SYSTEMS:  Review of Systems  PHYSICAL EXAM:  Physical Exam  HENT:      Head: Normocephalic.      Mouth/Throat:      Mouth: Mucous membranes are moist.   Eyes:      Extraocular Movements: Extraocular movements intact.   Cardiovascular:      Rate and Rhythm: Normal rate and regular rhythm.   Abdominal:      General: Abdomen is flat. There is no distension.      Palpations: Abdomen is soft.      Tenderness: There is no abdominal tenderness.      Comments: Midline with large scar tissue defect. Scab over superior wound with small ulceration in distal wound. No signs of infection    Musculoskeletal:      Cervical back: Normal range of motion and neck supple.   Neurological:      General: No focal deficit present.      Mental Status: He is alert.   Psychiatric:         Mood and Affect: Mood normal.         IMAGING SUMMARY:  (summary of findings, not a copy of dictation)  NA      I have reviewed all laboratory and imaging results ordered/pertinent for this encounter.
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
## 2024-02-02 NOTE — PROGRESS NOTES
Asked by Provider to see patient regarding wound care. Patient awaits general surgery eval so I will follow up after their evaluation of the patient. Pt also awaits CT of neck. Will follow.  MARY JAMES Transitional Care Coordinator

## 2024-02-02 NOTE — PROGRESS NOTES
Patient seen by general surgery and wound care recommendations noted. Pt has a finding on his neck CT that may require admission to the hospital now-pending vascular surgery consult. This patient lives at [facility], a treatment center, at present.  Will continue to follow.   MARY JAMES Transitional Care Coordinator

## 2024-02-03 PROCEDURE — 93880 EXTRACRANIAL BILAT STUDY: CPT | Performed by: STUDENT IN AN ORGANIZED HEALTH CARE EDUCATION/TRAINING PROGRAM

## 2024-02-03 PROCEDURE — 2500000001 HC RX 250 WO HCPCS SELF ADMINISTERED DRUGS (ALT 637 FOR MEDICARE OP): Performed by: INTERNAL MEDICINE

## 2024-02-03 PROCEDURE — 1100000001 HC PRIVATE ROOM DAILY

## 2024-02-03 PROCEDURE — 99232 SBSQ HOSP IP/OBS MODERATE 35: CPT | Performed by: INTERNAL MEDICINE

## 2024-02-03 PROCEDURE — 2500000004 HC RX 250 GENERAL PHARMACY W/ HCPCS (ALT 636 FOR OP/ED): Performed by: STUDENT IN AN ORGANIZED HEALTH CARE EDUCATION/TRAINING PROGRAM

## 2024-02-03 PROCEDURE — G0378 HOSPITAL OBSERVATION PER HR: HCPCS

## 2024-02-03 PROCEDURE — 2500000001 HC RX 250 WO HCPCS SELF ADMINISTERED DRUGS (ALT 637 FOR MEDICARE OP): Performed by: STUDENT IN AN ORGANIZED HEALTH CARE EDUCATION/TRAINING PROGRAM

## 2024-02-03 RX ORDER — PREDNISONE 20 MG/1
TABLET ORAL
Status: ON HOLD | COMMUNITY
Start: 2024-01-16 | End: 2024-02-03 | Stop reason: WASHOUT

## 2024-02-03 RX ORDER — GABAPENTIN 300 MG/1
300 CAPSULE ORAL DAILY
Status: DISCONTINUED | OUTPATIENT
Start: 2024-02-03 | End: 2024-02-08 | Stop reason: HOSPADM

## 2024-02-03 RX ORDER — BENZTROPINE MESYLATE 1 MG/1
1 TABLET ORAL 2 TIMES DAILY
COMMUNITY
Start: 2024-01-02

## 2024-02-03 RX ORDER — RISPERIDONE 2 MG/1
2 TABLET ORAL 2 TIMES DAILY
COMMUNITY
Start: 2024-01-02

## 2024-02-03 RX ORDER — MECLIZINE HYDROCHLORIDE 25 MG/1
25 TABLET ORAL 3 TIMES DAILY PRN
Status: DISCONTINUED | OUTPATIENT
Start: 2024-02-03 | End: 2024-02-08 | Stop reason: HOSPADM

## 2024-02-03 RX ORDER — HYDROCORTISONE 25 MG/G
CREAM TOPICAL 2 TIMES DAILY
Status: ON HOLD | COMMUNITY
Start: 2020-06-03 | End: 2024-02-03 | Stop reason: WASHOUT

## 2024-02-03 RX ORDER — TAMSULOSIN HYDROCHLORIDE 0.4 MG/1
0.4 CAPSULE ORAL DAILY
COMMUNITY

## 2024-02-03 RX ORDER — LORATADINE 10 MG/1
TABLET ORAL DAILY PRN
Status: ON HOLD | COMMUNITY
Start: 2019-12-12 | End: 2024-02-03 | Stop reason: WASHOUT

## 2024-02-03 RX ORDER — HYDROXYZINE HYDROCHLORIDE 25 MG/1
50 TABLET, FILM COATED ORAL 2 TIMES DAILY PRN
Status: DISCONTINUED | OUTPATIENT
Start: 2024-02-03 | End: 2024-02-08 | Stop reason: HOSPADM

## 2024-02-03 RX ORDER — DOXEPIN HYDROCHLORIDE 50 MG/1
50 CAPSULE ORAL NIGHTLY
Status: DISCONTINUED | OUTPATIENT
Start: 2024-02-03 | End: 2024-02-08 | Stop reason: HOSPADM

## 2024-02-03 RX ORDER — DIVALPROEX SODIUM 500 MG/1
1 TABLET, DELAYED RELEASE ORAL DAILY
Status: ON HOLD | COMMUNITY
Start: 2019-09-24 | End: 2024-02-03 | Stop reason: WASHOUT

## 2024-02-03 RX ORDER — RISPERIDONE 2 MG/1
2 TABLET ORAL 2 TIMES DAILY
Status: DISCONTINUED | OUTPATIENT
Start: 2024-02-03 | End: 2024-02-08 | Stop reason: HOSPADM

## 2024-02-03 RX ORDER — HYDROXYZINE HYDROCHLORIDE 50 MG/1
50 TABLET, FILM COATED ORAL 2 TIMES DAILY PRN
COMMUNITY
Start: 2024-01-02

## 2024-02-03 RX ORDER — BENZTROPINE MESYLATE 1 MG/1
1 TABLET ORAL 2 TIMES DAILY
Status: DISCONTINUED | OUTPATIENT
Start: 2024-02-03 | End: 2024-02-08 | Stop reason: HOSPADM

## 2024-02-03 RX ORDER — GABAPENTIN 300 MG/1
300 CAPSULE ORAL DAILY
COMMUNITY
Start: 2024-01-15

## 2024-02-03 RX ORDER — TAMSULOSIN HYDROCHLORIDE 0.4 MG/1
0.4 CAPSULE ORAL DAILY
Status: DISCONTINUED | OUTPATIENT
Start: 2024-02-03 | End: 2024-02-08 | Stop reason: HOSPADM

## 2024-02-03 RX ORDER — DOXEPIN HYDROCHLORIDE 50 MG/1
50 CAPSULE ORAL NIGHTLY
COMMUNITY
Start: 2024-01-02

## 2024-02-03 RX ADMIN — DOXEPIN HYDROCHLORIDE 50 MG: 50 CAPSULE ORAL at 23:33

## 2024-02-03 RX ADMIN — ASPIRIN 81 MG CHEWABLE TABLET 81 MG: 81 TABLET CHEWABLE at 10:02

## 2024-02-03 RX ADMIN — BENZTROPINE MESYLATE 1 MG: 1 TABLET ORAL at 23:33

## 2024-02-03 RX ADMIN — ATORVASTATIN CALCIUM 40 MG: 40 TABLET, FILM COATED ORAL at 20:36

## 2024-02-03 RX ADMIN — RISPERIDONE 2 MG: 2 TABLET ORAL at 23:33

## 2024-02-03 RX ADMIN — POLYETHYLENE GLYCOL 3350 17 G: 17 POWDER, FOR SOLUTION ORAL at 10:02

## 2024-02-03 SDOH — SOCIAL STABILITY: SOCIAL INSECURITY: WERE YOU ABLE TO COMPLETE ALL THE BEHAVIORAL HEALTH SCREENINGS?: YES

## 2024-02-03 SDOH — SOCIAL STABILITY: SOCIAL NETWORK

## 2024-02-03 SDOH — HEALTH STABILITY: PHYSICAL HEALTH
ON AVERAGE, HOW MANY DAYS PER WEEK DO YOU ENGAGE IN MODERATE TO STRENUOUS EXERCISE (LIKE A BRISK WALK)?: PATIENT DECLINED

## 2024-02-03 SDOH — ECONOMIC STABILITY: FOOD INSECURITY: WITHIN THE PAST 12 MONTHS, YOU WORRIED THAT YOUR FOOD WOULD RUN OUT BEFORE YOU GOT MONEY TO BUY MORE.: PATIENT DECLINED

## 2024-02-03 SDOH — ECONOMIC STABILITY: INCOME INSECURITY: IN THE LAST 12 MONTHS, WAS THERE A TIME WHEN YOU WERE NOT ABLE TO PAY THE MORTGAGE OR RENT ON TIME?: PATIENT DECLINED

## 2024-02-03 SDOH — ECONOMIC STABILITY: INCOME INSECURITY: HOW HARD IS IT FOR YOU TO PAY FOR THE VERY BASICS LIKE FOOD, HOUSING, MEDICAL CARE, AND HEATING?: VERY HARD

## 2024-02-03 SDOH — SOCIAL STABILITY: SOCIAL INSECURITY: ARE YOU OR HAVE YOU BEEN THREATENED OR ABUSED PHYSICALLY, EMOTIONALLY, OR SEXUALLY BY ANYONE?: NO

## 2024-02-03 SDOH — ECONOMIC STABILITY: FOOD INSECURITY: WITHIN THE PAST 12 MONTHS, THE FOOD YOU BOUGHT JUST DIDN'T LAST AND YOU DIDN'T HAVE MONEY TO GET MORE.: PATIENT DECLINED

## 2024-02-03 SDOH — SOCIAL STABILITY: SOCIAL INSECURITY: DO YOU FEEL ANYONE HAS EXPLOITED OR TAKEN ADVANTAGE OF YOU FINANCIALLY OR OF YOUR PERSONAL PROPERTY?: NO

## 2024-02-03 SDOH — SOCIAL STABILITY: SOCIAL INSECURITY: DOES ANYONE TRY TO KEEP YOU FROM HAVING/CONTACTING OTHER FRIENDS OR DOING THINGS OUTSIDE YOUR HOME?: NO

## 2024-02-03 SDOH — ECONOMIC STABILITY: HOUSING INSECURITY
IN THE LAST 12 MONTHS, WAS THERE A TIME WHEN YOU DID NOT HAVE A STEADY PLACE TO SLEEP OR SLEPT IN A SHELTER (INCLUDING NOW)?: NO

## 2024-02-03 SDOH — SOCIAL STABILITY: SOCIAL INSECURITY: HAVE YOU HAD THOUGHTS OF HARMING ANYONE ELSE?: NO

## 2024-02-03 SDOH — SOCIAL STABILITY: SOCIAL INSECURITY: DO YOU FEEL UNSAFE GOING BACK TO THE PLACE WHERE YOU ARE LIVING?: NO

## 2024-02-03 SDOH — SOCIAL STABILITY: SOCIAL INSECURITY: HAS ANYONE EVER THREATENED TO HURT YOUR FAMILY OR YOUR PETS?: NO

## 2024-02-03 SDOH — ECONOMIC STABILITY: TRANSPORTATION INSECURITY
IN THE PAST 12 MONTHS, HAS THE LACK OF TRANSPORTATION KEPT YOU FROM MEDICAL APPOINTMENTS OR FROM GETTING MEDICATIONS?: YES

## 2024-02-03 SDOH — ECONOMIC STABILITY: TRANSPORTATION INSECURITY
IN THE PAST 12 MONTHS, HAS LACK OF TRANSPORTATION KEPT YOU FROM MEETINGS, WORK, OR FROM GETTING THINGS NEEDED FOR DAILY LIVING?: YES

## 2024-02-03 SDOH — HEALTH STABILITY: PHYSICAL HEALTH: ON AVERAGE, HOW MANY MINUTES DO YOU ENGAGE IN EXERCISE AT THIS LEVEL?: PATIENT DECLINED

## 2024-02-03 SDOH — HEALTH STABILITY: MENTAL HEALTH
STRESS IS WHEN SOMEONE FEELS TENSE, NERVOUS, ANXIOUS, OR CAN'T SLEEP AT NIGHT BECAUSE THEIR MIND IS TROUBLED. HOW STRESSED ARE YOU?: PATIENT DECLINED

## 2024-02-03 SDOH — SOCIAL STABILITY: SOCIAL INSECURITY: ABUSE: ADULT

## 2024-02-03 SDOH — SOCIAL STABILITY: SOCIAL INSECURITY: ARE THERE ANY APPARENT SIGNS OF INJURIES/BEHAVIORS THAT COULD BE RELATED TO ABUSE/NEGLECT?: NO

## 2024-02-03 ASSESSMENT — COGNITIVE AND FUNCTIONAL STATUS - GENERAL
PATIENT BASELINE BEDBOUND: NO
DAILY ACTIVITIY SCORE: 24
DAILY ACTIVITIY SCORE: 24
MOBILITY SCORE: 24
MOBILITY SCORE: 23
WALKING IN HOSPITAL ROOM: A LITTLE

## 2024-02-03 ASSESSMENT — ACTIVITIES OF DAILY LIVING (ADL)
ADEQUATE_TO_COMPLETE_ADL: YES
LACK_OF_TRANSPORTATION: PATIENT DECLINED
GROOMING: INDEPENDENT
TOILETING: INDEPENDENT
JUDGMENT_ADEQUATE_SAFELY_COMPLETE_DAILY_ACTIVITIES: YES
FEEDING YOURSELF: INDEPENDENT
WALKS IN HOME: NEEDS ASSISTANCE
BATHING: INDEPENDENT
LACK_OF_TRANSPORTATION: PATIENT DECLINED
HEARING - LEFT EAR: FUNCTIONAL
HEARING - RIGHT EAR: FUNCTIONAL
PATIENT'S MEMORY ADEQUATE TO SAFELY COMPLETE DAILY ACTIVITIES?: YES
DRESSING YOURSELF: INDEPENDENT

## 2024-02-03 ASSESSMENT — PATIENT HEALTH QUESTIONNAIRE - PHQ9
1. LITTLE INTEREST OR PLEASURE IN DOING THINGS: NOT AT ALL
2. FEELING DOWN, DEPRESSED OR HOPELESS: NOT AT ALL
SUM OF ALL RESPONSES TO PHQ9 QUESTIONS 1 & 2: 0

## 2024-02-03 ASSESSMENT — LIFESTYLE VARIABLES
HOW OFTEN DO YOU HAVE 6 OR MORE DRINKS ON ONE OCCASION: PATIENT DECLINED
HOW MANY STANDARD DRINKS CONTAINING ALCOHOL DO YOU HAVE ON A TYPICAL DAY: PATIENT DECLINED
PRESCIPTION_ABUSE_PAST_12_MONTHS: NO
SUBSTANCE_ABUSE_PAST_12_MONTHS: NO
HOW OFTEN DO YOU HAVE A DRINK CONTAINING ALCOHOL: PATIENT DECLINED
SKIP TO QUESTIONS 9-10: 0
AUDIT-C TOTAL SCORE: -1
AUDIT-C TOTAL SCORE: -1

## 2024-02-03 ASSESSMENT — PAIN SCALES - GENERAL
PAINLEVEL_OUTOF10: 0 - NO PAIN

## 2024-02-03 ASSESSMENT — PAIN - FUNCTIONAL ASSESSMENT: PAIN_FUNCTIONAL_ASSESSMENT: 0-10
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
## 2024-02-03 NOTE — PROGRESS NOTES
Pharmacy Medication History Review    Bjorn Cedeno is a 58 y.o. male admitted for Vertigo. Pharmacy reviewed the patient's [id number]-[id number]-[id number] medications and allergies for accuracy.    The list below reflects the updated PTA list. Comments regarding how patient may be taking medications differently can be found in the Admit Orders Activity.  Prior to Admission Medications   Prescriptions Informant   benztropine (Cogentin) 1 mg tablet Other   Sig: Take 1 tablet (1 mg) by mouth 2 times a day.   doxepin (SINEquan) 50 mg capsule Other   Sig: Take 1 capsule (50 mg) by mouth once daily at bedtime.   gabapentin (Neurontin) 300 mg capsule Other   Sig: Take 1 capsule (300 mg) by mouth once daily.   hydrOXYzine HCL (Atarax) 50 mg tablet Other   Sig: Take 1 tablet (50 mg) by mouth 2 times a day as needed for anxiety.   risperiDONE (RisperDAL) 2 mg tablet Other   Sig: Take 1 tablet (2 mg) by mouth 2 times a day.      Tamsulosin (Flomax) 0.4 mg 24 hr capsule  Sig: take 1 capsule (0.4 mg) by mouth once daily           The list below reflects the updated allergy list. Please review each documented allergy for additional clarification and justification.  Allergies  Reviewed by Selam Fontanez RN on 2/3/2024        Severity Reactions Comments    Ibuprofen Not Specified Unknown        Sources used to complete the med history include:  Out patient fill history: dispense report  OARRS: checked 02/03/24  Concerns: No  Chart Review  01/29/24 Outside Clinic Visit: [facility], Provider: JESSICA Mahajan  01/15/2024 Outside Hospital Visit: ED visit, [facility], Provider: Bijal  01/08/2024 [facility] ED Visit  12/29/2023: Psychiatry visit during incarceration, [facility], Provider: Ronnie Goff (see below)    From Care Everywhere: Psychiatry  ------------------------------------------------      [facility]  Outside Information OP Visit. Encounter: 9/19/2023 [facility] Intake    Ronnie Goff APRN-JESSICA -  12/29/2023 10:33 AM EST  Formatting of this note is different from the original.  F/U Psych Note    Pt was seen today for a routine f/u appt. Pt has maintained med compliance, denied SEs & ADRs (no TD/EPS), and reports benefiting from the prescribed medications. Pt reports complete resolution of sxs. Currently, Pt reports no issues with anxiety, sleep, or mood. . Additionally, the patient denied acute problems and any feelings of hopelessness, helplessness, or worthlessness. Suicidality & homicidality was neither reported, detected, nor endorsed when directly questioned. Pt expressed satisfaction with the current medication regimen & does not feel the need to modify the treatment plan at this time. No AVH. Future oriented, looks forward to being released. No objective sxs of psychosis noted. Pt did not appear to be internally stimulated. Pt has been utilizing Hydroxyzine 50mg BID PRN for anxiety daily w/ good perceived benefit.    Previous Psych Med Trials: Abilify & Prazosin (no perceived benefit)    MENTAL STATUS EXAMINATION:  Appearance & attitude: calm, cooperative, friendly.  Mood: “ a lot better”  Affect: euthymic, calmed. .  Speech: appropriate & spontaneous, normal rate & flow, clear.  Thought form: logical, organized, with tight association.  Thought content & perception: no abnormal processes noted.  Orientation: Oriented to time, person & place.  Memory & attention: sustained.  Judgment & insight: fair  Gait: slow but Normal for him.    RISK ASSESSMENT: Imminent risk for suicide or violence is low. There are, however, clinical, and historical risk factors contributing to possible increased risk on a more chronic basis. Discussed risk factor reduction. Pt was future oriented & agreed to communicate w/ staff if he starts feeling suicidal, homicidal, or unsafe.    PTSD, TAN (etoh, cocaine, cannabis, opioids).    R/O Schizophrenia per chart. Pt unable to recall any periods of psychosis or hypo/andres. Stated  that schizophrenia was recently ruled out - unable to elaborate.    Plan:  -Discussed alternatives + risks, benefits, and side effects of medications.  -Pt encouraged to [facility] for psychiatry when needed.  -No abnormal movements or evidence of TD or EPS noted on exam.  - Continue Risperidone 2mg BID, Cogentin 1mg BID, Doxepin 50mg QPM, & Hydroxyzine 50mg BID PRN for anxiety.  -RTC 8 weeks.    VAMSHI Goodrich CNP  Electronically signed by Ronnie Goff APRN-CNP at 12/29/2023 10:33 AM EST     ------------------------------------------------      Below are additional concerns with the patient's PTA list.  Patient was recently incarcerated with release in January 2024 resulting in gaps in medical records/medication history  Prior to incarceration, patient has history of following medications: Lac-Hydrin lotion, lidocaine 5% patches, midodrine, mirtazepine, olanzapine, omeprazole, sertraline, tamsulosin, sildenafil, quetiapine   Following incarceration, best estimated medication list is as above (see Prior to Admission Medications Table)  Psych Regimen  Pt saw VAMSHI Goodrich CNP while incarcerated - medication regimen modified  Notes patient's previously tried medications: abilify, prazosin, Zyprexa (olanzapine), Depakote, risperidone  Discontinued Remeron (Mirtazapine)   Last noted psych regimen:  Risperidone 2 mg BID  Benztropine 1 mg BID  Doxepin 50 mg QPM  Hydroxyzine 50 mg BID PRN for anxiety      Patient accepts M2B at discharge. [facility]  Insurance: Medicaid, MMIS: [id number] - unsure if active        WILLIE LINO, PharmD  PGY-1 Resident Pharmacist  Please reach out via Secure Chat for questions, or if no response call [employer] or [facility]
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
## 2024-02-03 NOTE — CONSULTS
"VASCULAR SURGERY CONSULT NOTE  [facility]  HPI:  Bjorn Cedeno is a 58 y.o. male who presents to the ER with complaint of dizziness/ vertigo since August that has been worse for the last week. Denies history of strokes. Vascular surgery is consulted for a CTA head/ neck read of a linear filling defect seen in the L ICA. Patient at this time still has dizziness, he is not able to identify exacerbating factors.     PMH:  COPD, lung nodules, bronchiectasis, prostate cancer    PSH:   Exploratory laparotomy for SBO with SBR and multiple washouts and eventual mesh closure of abdomen     Objective   Heart Rate:  [57-74]   Temperature:  [36.7 °C (98.1 °F)]   Respirations:  [15-20]   BP: (121-136)/(70-80)   Height:  [188 cm (6' 2\")]   Weight:  [82.6 kg (182 lb 1.6 oz)]   Pulse Ox:  [98 %-99 %]     Physical Exam:  General: NAD, AAOx3  Neuro: no gross deficits, speech WNL; slight gait disturbance  HEENT: NC/AT  CV: RRR  Pulse exam: 2+ radial, 2+ femoral, 2+ DP, 2+ PT  Pulm: normal respiratory effort on RA  Abd: notable scarring from prior surgeries  Extr: FROM, no deformities, no swelling    ROS:  12-point review of systems was performed and is negative except as noted above.     Labs:  Results from last 7 days   Lab Units 02/02/24  1320   WBC AUTO x10*3/uL 3.7*   HEMOGLOBIN g/dL 16.0   HEMATOCRIT % 45.7   PLATELETS AUTO x10*3/uL 208     Results from last 7 days   Lab Units 02/02/24  1320   SODIUM mmol/L 140   POTASSIUM mmol/L 4.7   CHLORIDE mmol/L 104   CO2 mmol/L 30   BUN mg/dL 12   CREATININE mg/dL 1.03   GLUCOSE mg/dL 86   CALCIUM mg/dL 9.6               Imaging:  CTA Head/ Neck:  IMPRESSION:  CT head  1. No acute intracranial abnormality.  2. Mild burden of microangiopathic white matter changes.      CTA head  1. No evidence for significant stenosis or large branch vessel  cutoffs of the intracranial vessels.      CTA neck  1. No evidence of source vessel arterial occlusion or " flow  significant stenosis within the neck.  2. Atherosclerosis involving the left carotid bifurcation and left  internal carotid artery origin. Within this region, there is a faint  linear filling defect with mild superior propagation that is  nonspecific and could reflect an element of turbulent flow and mixing  artifact adjacent to plaque components, possible unstable plaque  component extending into the lumen, or possibly a tiny dissection  component. This could be further assessed with MRA of the neck.    Assessment/Plan   58 y.o. male with dizziness/ vertigo. CT imaging in question has been reviewed and the lesion most likely represents an artifact from the posterior plaque in the carotid. However, for a more definitive answer, we recommend a carotid duplex (can be done in radiology). If this shows a dissection flap or thrombus, we can provide further recommendations.     Update: Carotid duplex performed and shows no dissection flap, or significant stenosis. Will sign off. Please call back with questions or concerns.    Seen and d/w attending, Dr. Falcon.    Steven Dailey MD  Vascular Surgery Fellow  Service Pager: [phone]  Available over Epic Chat
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
## 2024-02-03 NOTE — CARE PLAN
Problem: Pain  Goal: My pain/discomfort is manageable  Outcome: Progressing     Problem: Safety  Goal: Patient will be injury free during hospitalization  Outcome: Progressing  Goal: I will remain free of falls  Outcome: Progressing     Problem: Daily Care  Goal: Daily care needs are met  Outcome: Progressing     Problem: Psychosocial Needs  Goal: Demonstrates ability to cope with hospitalization/illness  Outcome: Progressing  Goal: Collaborate with me, my family, and caregiver to identify my specific goals  Outcome: Progressing     Problem: Discharge Barriers  Goal: My discharge needs are met  Outcome: Progressing     Problem: Skin  Goal: Decreased wound size/increased tissue granulation at next dressing change  Outcome: Progressing  Goal: Participates in plan/prevention/treatment measures  Outcome: Progressing  Goal: Prevent/manage excess moisture  Outcome: Progressing  Goal: Prevent/minimize sheer/friction injuries  Outcome: Progressing  Goal: Promote/optimize nutrition  Outcome: Progressing  Goal: Promote skin healing  Outcome: Progressing

## 2024-02-04 LAB
ALBUMIN SERPL BCP-MCNC: 3.7 G/DL (ref 3.4–5)
ANION GAP SERPL CALC-SCNC: 13 MMOL/L (ref 10–20)
BUN SERPL-MCNC: 16 MG/DL (ref 6–23)
CALCIUM SERPL-MCNC: 8.8 MG/DL (ref 8.6–10.6)
CHLORIDE SERPL-SCNC: 104 MMOL/L (ref 98–107)
CO2 SERPL-SCNC: 26 MMOL/L (ref 21–32)
CREAT SERPL-MCNC: 1.06 MG/DL (ref 0.5–1.3)
EGFRCR SERPLBLD CKD-EPI 2021: 80 ML/MIN/1.73M*2
ERYTHROCYTE [DISTWIDTH] IN BLOOD BY AUTOMATED COUNT: 11.6 % (ref 11.5–14.5)
GLUCOSE SERPL-MCNC: 93 MG/DL (ref 74–99)
HCT VFR BLD AUTO: 41.5 % (ref 41–52)
HGB BLD-MCNC: 14 G/DL (ref 13.5–17.5)
MCH RBC QN AUTO: 30 PG (ref 26–34)
MCHC RBC AUTO-ENTMCNC: 33.7 G/DL (ref 32–36)
MCV RBC AUTO: 89 FL (ref 80–100)
NRBC BLD-RTO: 0 /100 WBCS (ref 0–0)
PHOSPHATE SERPL-MCNC: 3.9 MG/DL (ref 2.5–4.9)
PLATELET # BLD AUTO: 186 X10*3/UL (ref 150–450)
POTASSIUM SERPL-SCNC: 3.8 MMOL/L (ref 3.5–5.3)
RBC # BLD AUTO: 4.67 X10*6/UL (ref 4.5–5.9)
SODIUM SERPL-SCNC: 139 MMOL/L (ref 136–145)
WBC # BLD AUTO: 3.7 X10*3/UL (ref 4.4–11.3)

## 2024-02-04 PROCEDURE — 36415 COLL VENOUS BLD VENIPUNCTURE: CPT | Performed by: INTERNAL MEDICINE

## 2024-02-04 PROCEDURE — 2500000004 HC RX 250 GENERAL PHARMACY W/ HCPCS (ALT 636 FOR OP/ED): Performed by: INTERNAL MEDICINE

## 2024-02-04 PROCEDURE — 80069 RENAL FUNCTION PANEL: CPT | Performed by: INTERNAL MEDICINE

## 2024-02-04 PROCEDURE — 2500000001 HC RX 250 WO HCPCS SELF ADMINISTERED DRUGS (ALT 637 FOR MEDICARE OP): Performed by: INTERNAL MEDICINE

## 2024-02-04 PROCEDURE — G0378 HOSPITAL OBSERVATION PER HR: HCPCS

## 2024-02-04 PROCEDURE — 2500000001 HC RX 250 WO HCPCS SELF ADMINISTERED DRUGS (ALT 637 FOR MEDICARE OP): Performed by: STUDENT IN AN ORGANIZED HEALTH CARE EDUCATION/TRAINING PROGRAM

## 2024-02-04 PROCEDURE — 85027 COMPLETE CBC AUTOMATED: CPT | Performed by: INTERNAL MEDICINE

## 2024-02-04 PROCEDURE — 1100000001 HC PRIVATE ROOM DAILY

## 2024-02-04 PROCEDURE — 99232 SBSQ HOSP IP/OBS MODERATE 35: CPT | Performed by: INTERNAL MEDICINE

## 2024-02-04 RX ADMIN — DOXEPIN HYDROCHLORIDE 50 MG: 50 CAPSULE ORAL at 20:41

## 2024-02-04 RX ADMIN — BENZTROPINE MESYLATE 1 MG: 1 TABLET ORAL at 20:41

## 2024-02-04 RX ADMIN — ASPIRIN 81 MG CHEWABLE TABLET 81 MG: 81 TABLET CHEWABLE at 09:10

## 2024-02-04 RX ADMIN — RISPERIDONE 2 MG: 2 TABLET ORAL at 09:11

## 2024-02-04 RX ADMIN — RISPERIDONE 2 MG: 2 TABLET ORAL at 20:41

## 2024-02-04 RX ADMIN — GABAPENTIN 300 MG: 300 CAPSULE ORAL at 09:10

## 2024-02-04 RX ADMIN — MECLIZINE HYDROCHLORIDE 25 MG: 25 TABLET ORAL at 09:11

## 2024-02-04 RX ADMIN — ATORVASTATIN CALCIUM 40 MG: 40 TABLET, FILM COATED ORAL at 20:41

## 2024-02-04 RX ADMIN — BENZTROPINE MESYLATE 1 MG: 1 TABLET ORAL at 09:11

## 2024-02-04 RX ADMIN — TAMSULOSIN HYDROCHLORIDE 0.4 MG: 0.4 CAPSULE ORAL at 09:10

## 2024-02-04 ASSESSMENT — PAIN SCALES - GENERAL
PAINLEVEL_OUTOF10: 0 - NO PAIN
PAINLEVEL_OUTOF10: 8

## 2024-02-04 ASSESSMENT — PAIN - FUNCTIONAL ASSESSMENT: PAIN_FUNCTIONAL_ASSESSMENT: 0-10

## 2024-02-05 LAB
ALBUMIN SERPL BCP-MCNC: 3.4 G/DL (ref 3.4–5)
ANION GAP SERPL CALC-SCNC: 13 MMOL/L (ref 10–20)
BUN SERPL-MCNC: 20 MG/DL (ref 6–23)
CALCIUM SERPL-MCNC: 8.7 MG/DL (ref 8.6–10.6)
CHLORIDE SERPL-SCNC: 106 MMOL/L (ref 98–107)
CO2 SERPL-SCNC: 24 MMOL/L (ref 21–32)
CREAT SERPL-MCNC: 1.19 MG/DL (ref 0.5–1.3)
EGFRCR SERPLBLD CKD-EPI 2021: 70 ML/MIN/1.73M*2
ERYTHROCYTE [DISTWIDTH] IN BLOOD BY AUTOMATED COUNT: 11.7 % (ref 11.5–14.5)
GLUCOSE SERPL-MCNC: 102 MG/DL (ref 74–99)
HCT VFR BLD AUTO: 41 % (ref 41–52)
HGB BLD-MCNC: 13.4 G/DL (ref 13.5–17.5)
MCH RBC QN AUTO: 29.6 PG (ref 26–34)
MCHC RBC AUTO-ENTMCNC: 32.7 G/DL (ref 32–36)
MCV RBC AUTO: 91 FL (ref 80–100)
NRBC BLD-RTO: 0 /100 WBCS (ref 0–0)
PHOSPHATE SERPL-MCNC: 3.4 MG/DL (ref 2.5–4.9)
PLATELET # BLD AUTO: 181 X10*3/UL (ref 150–450)
POTASSIUM SERPL-SCNC: 3.7 MMOL/L (ref 3.5–5.3)
RBC # BLD AUTO: 4.52 X10*6/UL (ref 4.5–5.9)
SODIUM SERPL-SCNC: 139 MMOL/L (ref 136–145)
WBC # BLD AUTO: 3.9 X10*3/UL (ref 4.4–11.3)

## 2024-02-05 PROCEDURE — G0378 HOSPITAL OBSERVATION PER HR: HCPCS

## 2024-02-05 PROCEDURE — 97161 PT EVAL LOW COMPLEX 20 MIN: CPT | Mod: GP | Performed by: STUDENT IN AN ORGANIZED HEALTH CARE EDUCATION/TRAINING PROGRAM

## 2024-02-05 PROCEDURE — 2500000005 HC RX 250 GENERAL PHARMACY W/O HCPCS: Performed by: STUDENT IN AN ORGANIZED HEALTH CARE EDUCATION/TRAINING PROGRAM

## 2024-02-05 PROCEDURE — 2500000004 HC RX 250 GENERAL PHARMACY W/ HCPCS (ALT 636 FOR OP/ED): Performed by: STUDENT IN AN ORGANIZED HEALTH CARE EDUCATION/TRAINING PROGRAM

## 2024-02-05 PROCEDURE — 80069 RENAL FUNCTION PANEL: CPT | Performed by: INTERNAL MEDICINE

## 2024-02-05 PROCEDURE — 2500000004 HC RX 250 GENERAL PHARMACY W/ HCPCS (ALT 636 FOR OP/ED): Performed by: INTERNAL MEDICINE

## 2024-02-05 PROCEDURE — 99232 SBSQ HOSP IP/OBS MODERATE 35: CPT | Performed by: STUDENT IN AN ORGANIZED HEALTH CARE EDUCATION/TRAINING PROGRAM

## 2024-02-05 PROCEDURE — 1100000001 HC PRIVATE ROOM DAILY

## 2024-02-05 PROCEDURE — 2500000001 HC RX 250 WO HCPCS SELF ADMINISTERED DRUGS (ALT 637 FOR MEDICARE OP): Performed by: STUDENT IN AN ORGANIZED HEALTH CARE EDUCATION/TRAINING PROGRAM

## 2024-02-05 PROCEDURE — 85027 COMPLETE CBC AUTOMATED: CPT | Performed by: INTERNAL MEDICINE

## 2024-02-05 PROCEDURE — 2500000001 HC RX 250 WO HCPCS SELF ADMINISTERED DRUGS (ALT 637 FOR MEDICARE OP): Performed by: INTERNAL MEDICINE

## 2024-02-05 PROCEDURE — 97165 OT EVAL LOW COMPLEX 30 MIN: CPT | Mod: GO

## 2024-02-05 RX ORDER — LIDOCAINE 560 MG/1
1 PATCH PERCUTANEOUS; TOPICAL; TRANSDERMAL DAILY
Status: DISCONTINUED | OUTPATIENT
Start: 2024-02-05 | End: 2024-02-08 | Stop reason: HOSPADM

## 2024-02-05 RX ORDER — ACETAMINOPHEN 325 MG/1
650 TABLET ORAL EVERY 6 HOURS PRN
Status: DISCONTINUED | OUTPATIENT
Start: 2024-02-05 | End: 2024-02-08 | Stop reason: HOSPADM

## 2024-02-05 RX ADMIN — TAMSULOSIN HYDROCHLORIDE 0.4 MG: 0.4 CAPSULE ORAL at 08:19

## 2024-02-05 RX ADMIN — RISPERIDONE 2 MG: 2 TABLET ORAL at 08:19

## 2024-02-05 RX ADMIN — BENZTROPINE MESYLATE 1 MG: 1 TABLET ORAL at 08:19

## 2024-02-05 RX ADMIN — ACETAMINOPHEN 650 MG: 325 TABLET ORAL at 20:11

## 2024-02-05 RX ADMIN — ASPIRIN 81 MG CHEWABLE TABLET 81 MG: 81 TABLET CHEWABLE at 08:19

## 2024-02-05 RX ADMIN — ACETAMINOPHEN 650 MG: 325 TABLET ORAL at 14:11

## 2024-02-05 RX ADMIN — RISPERIDONE 2 MG: 2 TABLET ORAL at 20:07

## 2024-02-05 RX ADMIN — POLYETHYLENE GLYCOL 3350 17 G: 17 POWDER, FOR SOLUTION ORAL at 08:19

## 2024-02-05 RX ADMIN — ATORVASTATIN CALCIUM 40 MG: 40 TABLET, FILM COATED ORAL at 20:07

## 2024-02-05 RX ADMIN — DOXEPIN HYDROCHLORIDE 50 MG: 50 CAPSULE ORAL at 20:07

## 2024-02-05 RX ADMIN — LIDOCAINE 1 PATCH: 4 PATCH TOPICAL at 14:12

## 2024-02-05 RX ADMIN — BENZTROPINE MESYLATE 1 MG: 1 TABLET ORAL at 20:07

## 2024-02-05 RX ADMIN — GABAPENTIN 300 MG: 300 CAPSULE ORAL at 08:19

## 2024-02-05 ASSESSMENT — COGNITIVE AND FUNCTIONAL STATUS - GENERAL
WALKING IN HOSPITAL ROOM: A LITTLE
HELP NEEDED FOR BATHING: A LITTLE
MOBILITY SCORE: 20
DAILY ACTIVITIY SCORE: 22
STANDING UP FROM CHAIR USING ARMS: A LITTLE
MOBILITY SCORE: 24
MOVING TO AND FROM BED TO CHAIR: A LITTLE
DAILY ACTIVITIY SCORE: 24
DRESSING REGULAR LOWER BODY CLOTHING: A LITTLE
CLIMB 3 TO 5 STEPS WITH RAILING: A LITTLE

## 2024-02-05 ASSESSMENT — ACTIVITIES OF DAILY LIVING (ADL)
ADL_ASSISTANCE: INDEPENDENT
BATHING_ASSISTANCE: STAND BY

## 2024-02-05 ASSESSMENT — PAIN - FUNCTIONAL ASSESSMENT
PAIN_FUNCTIONAL_ASSESSMENT: 0-10

## 2024-02-05 ASSESSMENT — PAIN SCALES - GENERAL
PAINLEVEL_OUTOF10: 7
PAINLEVEL_OUTOF10: 3
PAINLEVEL_OUTOF10: 0 - NO PAIN
PAINLEVEL_OUTOF10: 7

## 2024-02-05 NOTE — CARE PLAN
The patient's goals for the shift include safety    The clinical goals for the shift include Pt will remain safe and free of falls throughout the shift.    Pt remained safe and free of injury during night. No pain reported . Walking in the room with cane independently. Complained of left leg numbness and tingling pain from spine to leg, no medication added.  No other distress noted. Call light in reach. Resting quietly at this time.     Lisa Curry RN

## 2024-02-05 NOTE — PROGRESS NOTES
Occupational Therapy    Evaluation    Patient Name: Bjorn Cedeno  MRN: [id number]  Today's Date: 2/5/2024  Time Calculation  Start Time: 0948  Stop Time: 1001  Time Calculation (min): 13 min    Assessment  IP OT Assessment  OT Assessment: Pt will benefit from skilled OT while admitted to increase IND in ADLs/IADLs piror to d/c.  Prognosis: Good  Barriers to Discharge: Other (Comment) (Homeless - needs facility for d/c)  Evaluation/Treatment Tolerance: Patient tolerated treatment well  Medical Staff Made Aware: Yes  End of Session Communication: Bedside nurse  End of Session Patient Position: Bed, 3 rail up, Alarm off, not on at start of session  Plan:  Treatment Interventions: ADL retraining, Functional transfer training, UE strengthening/ROM, Endurance training, Patient/family training, Compensatory technique education  OT Frequency: 2 times per week  OT Discharge Recommendations: Low intensity level of continued care  OT Recommended Transfer Status: Stand by assist, Assist of 1  OT - OK to Discharge: Yes    Subjective   Current Problem:  1. Vertigo  Referral to Neurology      2. Abnormal computed tomography angiography (CTA) of neck          General:  General  Reason for Referral: Vertigo/dizziness  Past Medical History Relevant to Rehab: history of COPD, lung nodules, bronchiectasis, prostate cancer who underwent exploratory laparotomy after leaving AMA for small bowel obstruction in January 2023  Family/Caregiver Present: No  Prior to Session Communication: Bedside nurse  Patient Position Received: Bed, 3 rail up, Alarm off, not on at start of session  Preferred Learning Style: verbal, visual  General Comment: Pt agreeable to therapy evaluation.  Precautions:  Medical Precautions: Fall precautions  Post-Surgical Precautions: Abdominal surgery precautions  Vital Signs:     Pain:  Pain Assessment  Pain Assessment: 0-10  Pain Score: 7  Pain Type: Acute pain  Pain Location: Leg  Pain Orientation: Left    Objective    Cognition:  Overall Cognitive Status: Within Functional Limits  Orientation Level: Oriented X4           Home Living:  Type of Home: Homeless (has been staying at facility recently but unable to return)  Home Adaptive Equipment: Cane  Home Layout: One level  Home Access: Elevator (5th floor, pt reports elevator is frequently broken)  Bathroom Shower/Tub: Walk-in shower  Bathroom Toilet: Standard  Bathroom Equipment: None   Prior Function:  Level of [place]: Independent with ADLs and functional transfers, Independent with homemaking with ambulation  ADL Assistance: Independent  Homemaking Assistance: Independent  Ambulatory Assistance: Independent  Vocational: Unemployed  Leisure: TV, laying around  Prior Function Comments: Reports fals due to dizziness and L LE weakness  IADL History:  Homemaking Responsibilities: No  Current License: No  Occupation: Unemployed  Leisure and Hobbies: TV, laying around  IADL Comments: Facility provided meals, pt reports he does not have clothes  ADL:  Eating Assistance: Modified independent (Device) (Anticipated)  Eating Deficit: Setup  Grooming Assistance: Stand by (Anticipated)  Grooming Deficit: Supervision/safety  Bathing Assistance: Stand by (Anticipated)  Bathing Deficit: Supervision/safety  UE Dressing Assistance: Modified independent (Device) (Anticipated)  UE Dressing Deficit: Setup  LE Dressing Assistance: Stand by  LE Dressing Deficit: Increased time to complete, Verbal cueing (Cueing to maintain ABD precautions, able to partially obtain figure four to don/doff socks)  Toileting Assistance with Device: Stand by (Simulated)  Toileting Deficit: Supervison/safety  Activity Tolerance:  Endurance: Decreased tolerance for upright activites  Activity Tolerance Comments: Several rest breaks due to dizziness  Bed Mobility/Transfers: Bed Mobility  Bed Mobility: Yes  Bed Mobility 1  Bed Mobility 1: Supine to sitting, Sitting to supine  Level of Assistance 1: Close  supervision  Bed Mobility Comments 1: SBA for safety    Transfers  Transfer: Yes  Transfer 1  Transfer From 1: Sit to, Stand to  Transfer to 1: Stand, Sit  Technique 1: Sit to stand, Stand to sit  Transfer Level of Assistance 1: Contact guard  Trials/Comments 1: CGA for safety  Transfers 2  Transfer From 2: Bed to  Transfer to 2: Toilet  Technique 2:  (Ambulating)  Transfer Device 2: Cane  Transfer Level of Assistance 2: Close supervision  Trials/Comments 2: SBA  Sitting Balance:  Static Sitting Balance  Static Sitting-Balance Support: No upper extremity supported, Feet supported  Static Sitting-Level of Assistance: Independent  Standing Balance:  Static Standing Balance  Static Standing-Balance Support: Right upper extremity supported  Static Standing-Level of Assistance: Close supervision  Static Standing-Comment/Number of Minutes: Cane  Dynamic Standing Balance  Dynamic Standing-Balance Support: Right upper extremity supported  Dynamic Standing-Comments: CGA using cane  Modalities:     IADL's:   Homemaking Responsibilities: No  Current License: No  Occupation: Unemployed  Leisure and Hobbies: TV, laying around  IADL Comments: Facility provided meals, pt reports he does not have clothes  Vision: Vision - Basic Assessment  Current Vision: Wears glasses only for reading  Sensation:  Light Touch: No apparent deficits (Reports n/t in L LE)  Strength:  Strength Comments: WFL B UEs  Perception:  Inattention/Neglect: Appears intact  Coordination:  Movements are Fluid and Coordinated: Yes   Hand Function:  Hand Function  Gross Grasp: Functional  Coordination: Functional  Extremities: RUE   RUE : Within Functional Limits and LUE   LUE: Within Functional Limits    Outcome Measures: [facility] Daily Activity  Putting on and taking off regular lower body clothing: A little  Bathing (including washing, rinsing, drying): A little  Putting on and taking off regular upper body clothing: None  Toileting, which includes using toilet,  bedpan or urinal: None  Taking care of personal grooming such as brushing teeth: None  Eating Meals: None  Daily Activity - Total Score: 22     and OT Adult Other Outcome Measures  4AT: 4 AT -  Education Documentation  Body Mechanics, taught by Jean Marie Felipe OT at 2/5/2024 11:46 AM.  Learner: Patient  Readiness: Acceptance  Method: Explanation, Demonstration  Response: Verbalizes Understanding, Demonstrated Understanding    Precautions, taught by Jean Marie Felipe OT at 2/5/2024 11:46 AM.  Learner: Patient  Readiness: Acceptance  Method: Explanation, Demonstration  Response: Verbalizes Understanding, Demonstrated Understanding    ADL Training, taught by Jean Marie Felipe OT at 2/5/2024 11:46 AM.  Learner: Patient  Readiness: Acceptance  Method: Explanation, Demonstration  Response: Verbalizes Understanding, Demonstrated Understanding    Education Comments  No comments found.    Goals:   Encounter Problems       Encounter Problems (Active)       ADLs       Patient will complete daily grooming tasks brushing teeth and washing face/hair with independent level of assistance and PRN adaptive equipment while standing. (Progressing)       Start:  02/05/24    Expected End:  02/26/24            Patient will complete toileting including hygiene clothing management/hygiene with modified independent level of assistance and grab bars. (Progressing)       Start:  02/05/24    Expected End:  02/26/24               BALANCE       Pt will maintain dynamic standing balance during ADL task with modified independent level of assistance in order to demonstrate decreased risk of falling and improved postural control. (Progressing)       Start:  02/05/24    Expected End:  02/26/24               TRANSFERS       Patient will complete functional transfers with least restrictive device with modified independent level of assistance. (Progressing)       Start:  02/05/24    Expected End:  02/26/24                 Jean Marie COLUNGA/GUSTAVO
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
## 2024-02-05 NOTE — PROGRESS NOTES
Sw met with patient at bedside to discuss dc planning. Patient is requesting rehab sp IP. Patient has no skilled needs at this time. SW contacted patient PO Taina Durham [phone] who stated patient was court ordered to [facility] post release. Patient will return to [facility] at time of dc. Sw will continue to follow for dc planning needs.

## 2024-02-05 NOTE — CONSULTS
Wound Care Consult     Visit Date: 2/5/2024      Patient Name: Bjorn Cedeno         MRN: [id number]           YOB: 1965     Reason for Consult: Chronic midline wound         Wound History: small bowel obstruction in January 2023. Chronic midline wound      Wound Assessment:  Wound 02/03/24 Incision Abdomen (Active)   Wound Image   02/05/24 1135   Site Assessment Bleeding;Dry;Pink;Red 02/05/24 1135   Kaci-Wound Assessment Clean;Dry 02/05/24 1135   Shape round and linear 02/05/24 1135   Wound Length (cm) 8 cm 02/05/24 1135   Wound Width (cm) 3 cm 02/05/24 1135   Wound Surface Area (cm^2) 24 cm^2 02/05/24 1135   Wound Depth (cm) 0.1 cm 02/05/24 1135   Wound Volume (cm^3) 2.4 cm^3 02/05/24 1135   State of Healing Early/partial granulation 02/05/24 1135   Margins Well-defined edges 02/05/24 1135   Closure Open to air 02/05/24 1135   Drainage Description None 02/04/24 0911   Drainage Amount None 02/04/24 0911   Dressing ABD 02/05/24 0800   Dressing Changed New 02/05/24 1135   Dressing Status Clean;Dry 02/05/24 1135       Wound Team Summary Assessment: The wound was cleansed with normal saline and pat dry at this time.  Collagen was applied to the wound bed and hydrofera blue Ready foam was applied to the wound and covered with a 6x8 mepilex border dressing.      Wound Team Plan:   Recommendation: Every 3 days   Cleanse the wound with normal saline  Apply hydrofera blue ready foam to the wound   Cover the wound with a 6x8 mepilex border dressing.      Chito Corona RN-BC, CWON  2/5/2024  3:27 PM
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
## 2024-02-05 NOTE — CARE PLAN
The patient's goals for the shift include remaining safe and free of falls throughout shift.     The clinical goals for the shift include pt will remain stable        Problem: Pain  Goal: My pain/discomfort is manageable  Outcome: Progressing     Problem: Safety  Goal: Patient will be injury free during hospitalization  Outcome: Progressing  Goal: I will remain free of falls  Outcome: Progressing     Problem: Daily Care  Goal: Daily care needs are met  Outcome: Progressing     Problem: Psychosocial Needs  Goal: Demonstrates ability to cope with hospitalization/illness  Outcome: Progressing  Goal: Collaborate with me, my family, and caregiver to identify my specific goals  Outcome: Progressing     Problem: Discharge Barriers  Goal: My discharge needs are met  Outcome: Progressing     Problem: Skin  Goal: Decreased wound size/increased tissue granulation at next dressing change  Outcome: Progressing  Goal: Participates in plan/prevention/treatment measures  Outcome: Progressing  Goal: Prevent/manage excess moisture  Outcome: Progressing  Goal: Prevent/minimize sheer/friction injuries  Outcome: Progressing  Goal: Promote/optimize nutrition  Outcome: Progressing  Goal: Promote skin healing  Outcome: Progressing

## 2024-02-06 LAB
ALBUMIN SERPL BCP-MCNC: 3.6 G/DL (ref 3.4–5)
ANION GAP SERPL CALC-SCNC: 10 MMOL/L (ref 10–20)
BUN SERPL-MCNC: 16 MG/DL (ref 6–23)
CALCIUM SERPL-MCNC: 8.6 MG/DL (ref 8.6–10.6)
CHLORIDE SERPL-SCNC: 110 MMOL/L (ref 98–107)
CO2 SERPL-SCNC: 25 MMOL/L (ref 21–32)
CREAT SERPL-MCNC: 1.04 MG/DL (ref 0.5–1.3)
EGFRCR SERPLBLD CKD-EPI 2021: 82 ML/MIN/1.73M*2
ERYTHROCYTE [DISTWIDTH] IN BLOOD BY AUTOMATED COUNT: 11.4 % (ref 11.5–14.5)
GLUCOSE SERPL-MCNC: 97 MG/DL (ref 74–99)
HCT VFR BLD AUTO: 39.4 % (ref 41–52)
HGB BLD-MCNC: 13.8 G/DL (ref 13.5–17.5)
MCH RBC QN AUTO: 29.9 PG (ref 26–34)
MCHC RBC AUTO-ENTMCNC: 35 G/DL (ref 32–36)
MCV RBC AUTO: 86 FL (ref 80–100)
NRBC BLD-RTO: 0 /100 WBCS (ref 0–0)
PHOSPHATE SERPL-MCNC: 3.9 MG/DL (ref 2.5–4.9)
PLATELET # BLD AUTO: 174 X10*3/UL (ref 150–450)
POTASSIUM SERPL-SCNC: 3.9 MMOL/L (ref 3.5–5.3)
RBC # BLD AUTO: 4.61 X10*6/UL (ref 4.5–5.9)
SODIUM SERPL-SCNC: 141 MMOL/L (ref 136–145)
WBC # BLD AUTO: 4 X10*3/UL (ref 4.4–11.3)

## 2024-02-06 PROCEDURE — 2500000004 HC RX 250 GENERAL PHARMACY W/ HCPCS (ALT 636 FOR OP/ED): Performed by: INTERNAL MEDICINE

## 2024-02-06 PROCEDURE — 99232 SBSQ HOSP IP/OBS MODERATE 35: CPT | Performed by: STUDENT IN AN ORGANIZED HEALTH CARE EDUCATION/TRAINING PROGRAM

## 2024-02-06 PROCEDURE — 1100000001 HC PRIVATE ROOM DAILY

## 2024-02-06 PROCEDURE — 2500000001 HC RX 250 WO HCPCS SELF ADMINISTERED DRUGS (ALT 637 FOR MEDICARE OP): Performed by: STUDENT IN AN ORGANIZED HEALTH CARE EDUCATION/TRAINING PROGRAM

## 2024-02-06 PROCEDURE — G0378 HOSPITAL OBSERVATION PER HR: HCPCS

## 2024-02-06 PROCEDURE — 36415 COLL VENOUS BLD VENIPUNCTURE: CPT | Performed by: INTERNAL MEDICINE

## 2024-02-06 PROCEDURE — 2500000001 HC RX 250 WO HCPCS SELF ADMINISTERED DRUGS (ALT 637 FOR MEDICARE OP): Performed by: INTERNAL MEDICINE

## 2024-02-06 PROCEDURE — 2500000005 HC RX 250 GENERAL PHARMACY W/O HCPCS: Performed by: STUDENT IN AN ORGANIZED HEALTH CARE EDUCATION/TRAINING PROGRAM

## 2024-02-06 PROCEDURE — 85027 COMPLETE CBC AUTOMATED: CPT | Performed by: INTERNAL MEDICINE

## 2024-02-06 PROCEDURE — 84520 ASSAY OF UREA NITROGEN: CPT | Performed by: INTERNAL MEDICINE

## 2024-02-06 RX ORDER — NAPROXEN SODIUM 220 MG/1
81 TABLET, FILM COATED ORAL DAILY
Qty: 30 TABLET | Refills: 0
Start: 2024-02-06 | End: 2024-03-07

## 2024-02-06 RX ORDER — ACETAMINOPHEN 325 MG/1
650 TABLET ORAL EVERY 6 HOURS PRN
Qty: 30 TABLET | Refills: 0
Start: 2024-02-06

## 2024-02-06 RX ORDER — MECLIZINE HYDROCHLORIDE 25 MG/1
25 TABLET ORAL 3 TIMES DAILY PRN
Qty: 30 TABLET | Refills: 0
Start: 2024-02-06 | End: 2024-03-07

## 2024-02-06 RX ORDER — ATORVASTATIN CALCIUM 40 MG/1
40 TABLET, FILM COATED ORAL NIGHTLY
Qty: 30 TABLET | Refills: 0
Start: 2024-02-06 | End: 2024-03-07

## 2024-02-06 RX ADMIN — ASPIRIN 81 MG CHEWABLE TABLET 81 MG: 81 TABLET CHEWABLE at 08:39

## 2024-02-06 RX ADMIN — BENZTROPINE MESYLATE 1 MG: 1 TABLET ORAL at 08:40

## 2024-02-06 RX ADMIN — TAMSULOSIN HYDROCHLORIDE 0.4 MG: 0.4 CAPSULE ORAL at 08:39

## 2024-02-06 RX ADMIN — RISPERIDONE 2 MG: 2 TABLET ORAL at 21:01

## 2024-02-06 RX ADMIN — ATORVASTATIN CALCIUM 40 MG: 40 TABLET, FILM COATED ORAL at 21:01

## 2024-02-06 RX ADMIN — RISPERIDONE 2 MG: 2 TABLET ORAL at 08:40

## 2024-02-06 RX ADMIN — LIDOCAINE 1 PATCH: 4 PATCH TOPICAL at 08:42

## 2024-02-06 RX ADMIN — MECLIZINE HYDROCHLORIDE 25 MG: 25 TABLET ORAL at 08:42

## 2024-02-06 RX ADMIN — ACETAMINOPHEN 650 MG: 325 TABLET ORAL at 08:39

## 2024-02-06 RX ADMIN — BENZTROPINE MESYLATE 1 MG: 1 TABLET ORAL at 21:01

## 2024-02-06 RX ADMIN — GABAPENTIN 300 MG: 300 CAPSULE ORAL at 08:40

## 2024-02-06 RX ADMIN — DOXEPIN HYDROCHLORIDE 50 MG: 50 CAPSULE ORAL at 21:01

## 2024-02-06 ASSESSMENT — PAIN SCALES - GENERAL
PAINLEVEL_OUTOF10: 3
PAINLEVEL_OUTOF10: 0 - NO PAIN
PAINLEVEL_OUTOF10: 7

## 2024-02-06 ASSESSMENT — PAIN - FUNCTIONAL ASSESSMENT
PAIN_FUNCTIONAL_ASSESSMENT: 0-10
PAIN_FUNCTIONAL_ASSESSMENT: 0-10

## 2024-02-06 NOTE — CARE PLAN
The patient's goals for the shift include safety    The clinical goals for the shift include pt will remain stable      Problem: Pain  Goal: My pain/discomfort is manageable  Outcome: Progressing     Problem: Safety  Goal: Patient will be injury free during hospitalization  Outcome: Progressing  Goal: I will remain free of falls  Outcome: Progressing     Problem: Daily Care  Goal: Daily care needs are met  Outcome: Progressing     Problem: Psychosocial Needs  Goal: Demonstrates ability to cope with hospitalization/illness  Outcome: Progressing  Goal: Collaborate with me, my family, and caregiver to identify my specific goals  Outcome: Progressing     Problem: Discharge Barriers  Goal: My discharge needs are met  Outcome: Progressing

## 2024-02-07 PROCEDURE — 2500000005 HC RX 250 GENERAL PHARMACY W/O HCPCS: Performed by: STUDENT IN AN ORGANIZED HEALTH CARE EDUCATION/TRAINING PROGRAM

## 2024-02-07 PROCEDURE — 2500000004 HC RX 250 GENERAL PHARMACY W/ HCPCS (ALT 636 FOR OP/ED): Performed by: STUDENT IN AN ORGANIZED HEALTH CARE EDUCATION/TRAINING PROGRAM

## 2024-02-07 PROCEDURE — 2500000001 HC RX 250 WO HCPCS SELF ADMINISTERED DRUGS (ALT 637 FOR MEDICARE OP): Performed by: INTERNAL MEDICINE

## 2024-02-07 PROCEDURE — 2500000001 HC RX 250 WO HCPCS SELF ADMINISTERED DRUGS (ALT 637 FOR MEDICARE OP): Performed by: STUDENT IN AN ORGANIZED HEALTH CARE EDUCATION/TRAINING PROGRAM

## 2024-02-07 PROCEDURE — G0378 HOSPITAL OBSERVATION PER HR: HCPCS

## 2024-02-07 PROCEDURE — 99232 SBSQ HOSP IP/OBS MODERATE 35: CPT | Performed by: STUDENT IN AN ORGANIZED HEALTH CARE EDUCATION/TRAINING PROGRAM

## 2024-02-07 PROCEDURE — 97116 GAIT TRAINING THERAPY: CPT | Mod: GP,CQ

## 2024-02-07 PROCEDURE — 1100000001 HC PRIVATE ROOM DAILY

## 2024-02-07 PROCEDURE — 2500000004 HC RX 250 GENERAL PHARMACY W/ HCPCS (ALT 636 FOR OP/ED): Performed by: INTERNAL MEDICINE

## 2024-02-07 RX ADMIN — BENZTROPINE MESYLATE 1 MG: 1 TABLET ORAL at 08:21

## 2024-02-07 RX ADMIN — RISPERIDONE 2 MG: 2 TABLET ORAL at 20:25

## 2024-02-07 RX ADMIN — GABAPENTIN 300 MG: 300 CAPSULE ORAL at 08:21

## 2024-02-07 RX ADMIN — POLYETHYLENE GLYCOL 3350 17 G: 17 POWDER, FOR SOLUTION ORAL at 08:21

## 2024-02-07 RX ADMIN — ACETAMINOPHEN 650 MG: 325 TABLET ORAL at 14:14

## 2024-02-07 RX ADMIN — MECLIZINE HYDROCHLORIDE 25 MG: 25 TABLET ORAL at 13:09

## 2024-02-07 RX ADMIN — DOXEPIN HYDROCHLORIDE 50 MG: 50 CAPSULE ORAL at 20:25

## 2024-02-07 RX ADMIN — RISPERIDONE 2 MG: 2 TABLET ORAL at 08:21

## 2024-02-07 RX ADMIN — BENZTROPINE MESYLATE 1 MG: 1 TABLET ORAL at 20:25

## 2024-02-07 RX ADMIN — ATORVASTATIN CALCIUM 40 MG: 40 TABLET, FILM COATED ORAL at 20:25

## 2024-02-07 RX ADMIN — LIDOCAINE 1 PATCH: 4 PATCH TOPICAL at 08:21

## 2024-02-07 RX ADMIN — TAMSULOSIN HYDROCHLORIDE 0.4 MG: 0.4 CAPSULE ORAL at 08:21

## 2024-02-07 RX ADMIN — ASPIRIN 81 MG CHEWABLE TABLET 81 MG: 81 TABLET CHEWABLE at 08:21

## 2024-02-07 ASSESSMENT — COGNITIVE AND FUNCTIONAL STATUS - GENERAL
WALKING IN HOSPITAL ROOM: A LITTLE
MOBILITY SCORE: 19
CLIMB 3 TO 5 STEPS WITH RAILING: A LOT
STANDING UP FROM CHAIR USING ARMS: A LITTLE
MOVING TO AND FROM BED TO CHAIR: A LITTLE

## 2024-02-07 ASSESSMENT — PAIN SCALES - GENERAL
PAINLEVEL_OUTOF10: 7
PAINLEVEL_OUTOF10: 4
PAINLEVEL_OUTOF10: 0 - NO PAIN
PAINLEVEL_OUTOF10: 7

## 2024-02-07 ASSESSMENT — PAIN DESCRIPTION - LOCATION: LOCATION: HEAD

## 2024-02-07 ASSESSMENT — PAIN - FUNCTIONAL ASSESSMENT
PAIN_FUNCTIONAL_ASSESSMENT: 0-10

## 2024-02-07 NOTE — PROGRESS NOTES
Physical Therapy                 Therapy Communication Note    Patient Name: Bjorn Cedeno  MRN: [id number]  Today's Date: 2/7/2024     Discipline: Physical Therapy    Missed Visit Reason: Missed Visit Reason:  (Pt eating breakfast, requesting this therapist return later if able.)    Missed Time: Attempt 840    Comment:

## 2024-02-07 NOTE — PROGRESS NOTES
Physical Therapy    Physical Therapy Treatment    Patient Name: Bjorn Cedeno  MRN: [id number]  Today's Date: 2/7/2024  Time Calculation  Start Time: 1359  Stop Time: 1409  Time Calculation (min): 10 min       Assessment/Plan   PT Assessment  PT Assessment Results: Decreased strength, Decreased endurance, Decreased mobility  Evaluation/Treatment Tolerance: Patient tolerated treatment well  Medical Staff Made Aware: Yes  End of Session Communication: Bedside nurse  End of Session Patient Position: Bed, 3 rail up     PT Plan  Treatment/Interventions: Bed mobility, Transfer training, Gait training, Balance training, Neuromuscular re-education, Endurance training, Strengthening, Range of motion, Therapeutic exercise, Therapeutic activity, Stair training  PT Plan: Skilled PT  PT Frequency: 3 times per week  PT Discharge Recommendations: Low intensity level of continued care  Equipment Recommended upon Discharge:  (Owns necessary equipment)  PT Recommended Transfer Status: Stand by assist, Assistive device  PT - OK to Discharge: Yes      General Visit Information:   PT  Visit  PT Received On: 02/07/24  Response to Previous Treatment: Patient with no complaints from previous session.  General  Prior to Session Communication: Bedside nurse  Patient Position Received: Bed, 3 rail up  General Comment:  (Pt. is pleasant and agreeable for PT treatment this date.)    Subjective   Precautions:  Precautions  Medical Precautions: Fall precautions  Post-Surgical Precautions: Abdominal surgery precautions  Vital Signs:       Objective   Pain:  Pain Assessment  Pain Assessment: 0-10  Pain Score: 7  Pain Type: Acute pain  Pain Location: Leg  Pain Orientation:  (BLE)  Cognition:  Cognition  Overall Cognitive Status: Within Functional Limits  Orientation Level: Oriented X4    Therapeutic Activity  Therapeutic Activity Performed: Yes (STS x5 from chair with arm utilizing cane. Pt. is close SUP with no LOB)       Ambulation/Gait  Training  Ambulation/Gait Training Performed: Yes  Ambulation/Gait Training 1  Surface 1: Level tile  Device 1: Single point cane  Assistance 1: Distant supervision  Comments/Distance (ft) 1: 200'x1 (No seated rest break required, no LOB)  Transfers  Transfer: Yes  Transfer 1  Transfer From 1: Bed to  Transfer to 1: Stand  Technique 1: Sit to stand  Transfer Device 1: Cane (Single point)  Transfer Level of Assistance 1: Distant supervision  Trials/Comments 1: x1  Transfers 2  Transfer From 2: Stand to  Transfer to 2: Bed  Technique 2: Stand to sit  Transfer Device 2: Cane  Transfer Level of Assistance 2: Distant supervision  Trials/Comments 2: x1       [facility] Basic Mobility  Turning from your back to your side while in a flat bed without using bedrails: None  Moving from lying on your back to sitting on the side of a flat bed without using bedrails: None  Moving to and from bed to chair (including a wheelchair): A little  Standing up from a chair using your arms (e.g. wheelchair or bedside chair): A little  To walk in hospital room: A little  Climbing 3-5 steps with railing: A lot  Basic Mobility - Total Score: 19       Education Documentation  Precautions, taught by SUZY Bryson at 2/7/2024  2:27 PM.  Learner: Patient  Readiness: Acceptance  Method: Explanation  Response: Verbalizes Understanding    Body Mechanics, taught by SUZY Bryson at 2/7/2024  2:27 PM.  Learner: Patient  Readiness: Acceptance  Method: Explanation  Response: Verbalizes Understanding    Mobility Training, taught by SUZY Bryson at 2/7/2024  2:27 PM.  Learner: Patient  Readiness: Acceptance  Method: Explanation  Response: Verbalizes Understanding    Encounter Problems       Encounter Problems (Active)       Safety       LTG - Patient will adhere to hip precautions during ADL's and transfers       Start:  02/03/24    Expected End:  02/04/24            LTG - Patient will demonstrate safety requirements appropriate to  situation/environment       Start:  02/03/24    Expected End:  02/04/24            LTG - Patient will utilize safety techniques       Start:  02/03/24    Expected End:  02/04/24            STG - Patient locks brakes on wheelchair       Start:  02/03/24    Expected End:  02/04/24            STG - Patient uses call light consistently to request assistance with transfers       Start:  02/03/24    Expected End:  02/04/24            STG - Patient uses gait belt during all transfers       Start:  02/03/24    Expected End:  02/04/24            Goal 1       Start:  02/03/24    Expected End:  02/04/24            Goal 2       Start:  02/03/24    Expected End:  02/04/24            Goal 3       Start:  02/03/24    Expected End:  02/04/24                  Encounter Problems (Resolved)       PT Problem       Patient will perform sit<>stand transfer with Cane, and Modified Independent  (Met)       Start:  02/05/24    Resolved:  02/07/24         Patient will ambulate >150' with Cane and Modified Independent  (Met)       Start:  02/05/24    Resolved:  02/07/24              SUZY Bryson
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
## 2024-02-07 NOTE — CARE PLAN
The clinical goals for the shift include pain control and safety    Problem: Pain  Goal: My pain/discomfort is manageable  Outcome: Progressing     Problem: Safety  Goal: Patient will be injury free during hospitalization  Outcome: Progressing  Goal: I will remain free of falls  Outcome: Progressing     Problem: Daily Care  Goal: Daily care needs are met  Outcome: Met     Problem: Psychosocial Needs  Goal: Demonstrates ability to cope with hospitalization/illness  Outcome: Progressing     Problem: Discharge Barriers  Goal: My discharge needs are met  Outcome: Progressing
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
## 2024-02-07 NOTE — CARE PLAN
The patient's goals for the shift include pt will remain neurologically intact -met    The clinical goals for the shift include pt will use call light and remain safe -met

## 2024-02-08 VITALS
TEMPERATURE: 99 F | HEART RATE: 69 BPM | DIASTOLIC BLOOD PRESSURE: 80 MMHG | HEIGHT: 74 IN | BODY MASS INDEX: 23.37 KG/M2 | WEIGHT: 182.1 LBS | RESPIRATION RATE: 16 BRPM | SYSTOLIC BLOOD PRESSURE: 136 MMHG | OXYGEN SATURATION: 94 %

## 2024-02-08 PROCEDURE — G0378 HOSPITAL OBSERVATION PER HR: HCPCS

## 2024-02-08 PROCEDURE — 2500000001 HC RX 250 WO HCPCS SELF ADMINISTERED DRUGS (ALT 637 FOR MEDICARE OP): Performed by: INTERNAL MEDICINE

## 2024-02-08 PROCEDURE — 2500000001 HC RX 250 WO HCPCS SELF ADMINISTERED DRUGS (ALT 637 FOR MEDICARE OP): Performed by: STUDENT IN AN ORGANIZED HEALTH CARE EDUCATION/TRAINING PROGRAM

## 2024-02-08 PROCEDURE — 2500000004 HC RX 250 GENERAL PHARMACY W/ HCPCS (ALT 636 FOR OP/ED): Performed by: INTERNAL MEDICINE

## 2024-02-08 PROCEDURE — 99238 HOSP IP/OBS DSCHRG MGMT 30/<: CPT | Performed by: STUDENT IN AN ORGANIZED HEALTH CARE EDUCATION/TRAINING PROGRAM

## 2024-02-08 PROCEDURE — 2500000005 HC RX 250 GENERAL PHARMACY W/O HCPCS: Performed by: STUDENT IN AN ORGANIZED HEALTH CARE EDUCATION/TRAINING PROGRAM

## 2024-02-08 RX ORDER — NON-ADHERENT BANDAGE 8"X10"
1 BANDAGE TOPICAL DAILY
Qty: 30 EACH | Refills: 0 | Status: SHIPPED | OUTPATIENT
Start: 2024-02-08

## 2024-02-08 RX ORDER — FOAM BANDAGE 8"X5.5"
1 BANDAGE TOPICAL DAILY
Qty: 30 EACH | Refills: 0 | Status: SHIPPED | OUTPATIENT
Start: 2024-02-08 | End: 2024-02-08 | Stop reason: SDUPTHER

## 2024-02-08 RX ORDER — NON-ADHERENT BANDAGE 8"X10"
1 BANDAGE TOPICAL DAILY
Qty: 30 EACH | Refills: 0 | Status: SHIPPED | OUTPATIENT
Start: 2024-02-08 | End: 2024-02-08 | Stop reason: SDUPTHER

## 2024-02-08 RX ORDER — FOAM BANDAGE 8"X5.5"
1 BANDAGE TOPICAL DAILY
Qty: 30 EACH | Refills: 0 | Status: SHIPPED | OUTPATIENT
Start: 2024-02-08

## 2024-02-08 RX ADMIN — TAMSULOSIN HYDROCHLORIDE 0.4 MG: 0.4 CAPSULE ORAL at 09:31

## 2024-02-08 RX ADMIN — BENZTROPINE MESYLATE 1 MG: 1 TABLET ORAL at 09:31

## 2024-02-08 RX ADMIN — LIDOCAINE 1 PATCH: 4 PATCH TOPICAL at 09:30

## 2024-02-08 RX ADMIN — ASPIRIN 81 MG CHEWABLE TABLET 81 MG: 81 TABLET CHEWABLE at 09:31

## 2024-02-08 RX ADMIN — RISPERIDONE 2 MG: 2 TABLET ORAL at 09:31

## 2024-02-08 RX ADMIN — GABAPENTIN 300 MG: 300 CAPSULE ORAL at 09:31

## 2024-02-08 RX ADMIN — ACETAMINOPHEN 650 MG: 325 TABLET ORAL at 18:27

## 2024-02-08 ASSESSMENT — PAIN SCALES - GENERAL
PAINLEVEL_OUTOF10: 5 - MODERATE PAIN
PAINLEVEL_OUTOF10: 0 - NO PAIN

## 2024-02-08 NOTE — CARE PLAN
The clinical goals for the shift include pain control and safety      Problem: Pain  Goal: My pain/discomfort is manageable  Outcome: Progressing     Problem: Safety  Goal: Patient will be injury free during hospitalization  Outcome: Progressing  Goal: I will remain free of falls  Outcome: Progressing     Problem: Psychosocial Needs  Goal: Demonstrates ability to cope with hospitalization/illness  Outcome: Progressing     Problem: Discharge Barriers  Goal: My discharge needs are met  Outcome: Progressing     Derryl remained safe and free from injury/falls by end of shift.
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
## 2024-02-08 NOTE — DISCHARGE SUMMARY
Discharge Diagnosis  chronic nonhealing wound to his abdomen    Issues Requiring Follow-Up        58 year old Male with history of COPD, lung nodules, bronchiectasis, prostate cancer who underwent exploratory laparotomy after leaving [facility] for small bowel obstruction in January 2023 presents today for evaluation of multiple medical complaints.  Patient has a chronic nonhealing wound to his abdomen, was incarcerated from September through January, but lost to follow-up, he states that the wound looks the way that it normally does however the place where he is currently at which is [facility], they do not have dressing supplies, ABDs or Xeroform.   Pt won't be able to receive home care for wound management while residing at the nursing home/shelter facility; he must return there. However, he won't qualify for home care until he has established PCP oversight. He received instructions on how to perform the dressing changes independently. Reports that he's skilled in managing his wound care, having done so since February last year without any complications.  I provided him with a prescription for wound care supplies, ensuring he's adequately prepared for discharge.  [facility] was notified.                 Test Results Pending At Discharge  Pending Labs       No current pending labs.            Hospital Course       A 58-year-old male patient with a medical history including COPD, lung nodules, bronchiectasis, and prostate cancer, who previously underwent exploratory laparotomy following discharge against medical advice ([facility]) for small bowel obstruction in January 2023, presents for assessment of various medical concerns. The patient exhibits a chronic non-healing abdominal wound, which he reports has not significantly changed in appearance but lacks appropriate dressing supplies at his current residence, [facility]. He spent time incarcerated from September through January and was subsequently lost to follow-up. Despite the  "patient's need for wound management, he is unable to receive home care services while residing at the retirement/shelter facility, necessitating his return there. However, he cannot qualify for home care until he establishes primary care physician oversight. The patient has been educated on performing dressing changes independently and claims proficiency in managing his wound since February of the previous year without complications. A prescription for wound care supplies has been provided to ensure his readiness for discharge. The Transitional Care Coordinator has been informed of the patient's status.                Pertinent Physical Exam At Time of Discharge  Physical Exam    Home Medications     Medication List      START taking these medications     acetaminophen 325 mg tablet; Commonly known as: Tylenol; Take 2 tablets   (650 mg) by mouth every 6 hours if needed for mild pain (1 - 3).   Aquacel AG Foam 1.2 %- 8\" X 8\" bandage; Generic drug: silver-foam   bandage; Apply 1 each topically once daily.   aspirin 81 mg chewable tablet; Chew 1 tablet (81 mg) once daily.   atorvastatin 40 mg tablet; Commonly known as: Lipitor; Take 1 tablet (40   mg) by mouth once daily at bedtime.   Curity Abdominal Pad 8 X 10 \" bandage; Generic drug: non-adherent   bandage; Apply 1 each topically once daily.   meclizine 25 mg tablet; Commonly known as: Antivert; Take 1 tablet (25   mg) by mouth 3 times a day as needed for dizziness.     CONTINUE taking these medications     benztropine 1 mg tablet; Commonly known as: Cogentin   doxepin 50 mg capsule; Commonly known as: SINEquan   gabapentin 300 mg capsule; Commonly known as: Neurontin   hydrOXYzine HCL 50 mg tablet; Commonly known as: Atarax   risperiDONE 2 mg tablet; Commonly known as: RisperDAL   tamsulosin 0.4 mg 24 hr capsule; Commonly known as: Flomax       Outpatient Follow-Up  No future appointments.    Viktoria Gibson MD  "

## 2024-02-08 NOTE — PROGRESS NOTES
This patient is medically cleared to discharge back to [facility]. Patient will need transportation arranged via ambulance. I will continue to follow until discharged.

## 2024-02-09 ENCOUNTER — PATIENT OUTREACH (OUTPATIENT)
Dept: CARE COORDINATION | Facility: CLINIC | Age: 59
End: 2024-02-09
Payer: MEDICAID

## 2024-02-09 NOTE — PROGRESS NOTES
Discharge facility: [facility]  Discharge diagnosis: chronic nonhealing wound to his abdomen  Admission date: 2/3/24  Discharge date: 2/8/24  Follow Up Appointment Date: Needs scheduled    Outreach call to patient to support a smooth transition of care from recent admission.  Left voicemail message for patient with my contact information [phone].  Enrolled patient in [employer] chatbot for additional support and patient education through transition period.  Will continue to monitor through transition period.     Mary Olvera RN

## 2024-04-14 ENCOUNTER — APPOINTMENT (OUTPATIENT)
Dept: RADIOLOGY | Facility: HOSPITAL | Age: 59
End: 2024-04-14
Payer: MEDICAID

## 2024-04-14 ENCOUNTER — HOSPITAL ENCOUNTER (OUTPATIENT)
Facility: HOSPITAL | Age: 59
Setting detail: OBSERVATION
Discharge: NURSING, REHAB OR LONG-TERM CARE | End: 2024-04-21
Attending: EMERGENCY MEDICINE | Admitting: STUDENT IN AN ORGANIZED HEALTH CARE EDUCATION/TRAINING PROGRAM
Payer: MEDICAID

## 2024-04-14 DIAGNOSIS — T81.30XA WOUND DEHISCENCE: ICD-10-CM

## 2024-04-14 DIAGNOSIS — Z59.819 HOUSING INSTABILITY: ICD-10-CM

## 2024-04-14 DIAGNOSIS — S31.109A CHRONIC WOUND INFECTION OF ABDOMEN, INITIAL ENCOUNTER: Primary | ICD-10-CM

## 2024-04-14 DIAGNOSIS — L08.9 CHRONIC WOUND INFECTION OF ABDOMEN, INITIAL ENCOUNTER: Primary | ICD-10-CM

## 2024-04-14 PROBLEM — R10.9 ABDOMINAL PAIN: Status: ACTIVE | Noted: 2022-08-16

## 2024-04-14 PROBLEM — I10 PRIMARY HYPERTENSION: Status: ACTIVE | Noted: 2024-02-02

## 2024-04-14 PROBLEM — R41.0 DELIRIUM: Status: ACTIVE | Noted: 2023-01-24

## 2024-04-14 PROBLEM — Z59.00 HOMELESSNESS UNSPECIFIED: Status: ACTIVE | Noted: 2023-01-19

## 2024-04-14 PROBLEM — D63.8 ANEMIA IN OTHER CHRONIC DISEASES CLASSIFIED ELSEWHERE: Status: ACTIVE | Noted: 2023-01-19

## 2024-04-14 PROBLEM — N52.9 ERECTILE DYSFUNCTION: Status: ACTIVE | Noted: 2022-09-15

## 2024-04-14 PROBLEM — H65.02 ACUTE SEROUS OTITIS MEDIA OF LEFT EAR: Status: ACTIVE | Noted: 2022-09-15

## 2024-04-14 PROBLEM — J96.91 RESPIRATORY FAILURE, UNSPECIFIED WITH HYPOXIA (MULTI): Status: ACTIVE | Noted: 2023-02-21

## 2024-04-14 PROBLEM — S02.609A: Status: ACTIVE | Noted: 2023-08-13

## 2024-04-14 PROBLEM — K43.9 VENTRAL HERNIA WITHOUT OBSTRUCTION OR GANGRENE: Status: ACTIVE | Noted: 2023-02-21

## 2024-04-14 PROBLEM — K21.9 GASTRO-ESOPHAGEAL REFLUX DISEASE WITHOUT ESOPHAGITIS: Status: ACTIVE | Noted: 2023-08-18

## 2024-04-14 PROBLEM — J47.9 BRONCHIECTASIS (MULTI): Status: ACTIVE | Noted: 2022-05-02

## 2024-04-14 PROBLEM — R52 MECHANICAL PAIN: Status: ACTIVE | Noted: 2024-01-29

## 2024-04-14 PROBLEM — D62 ACUTE POSTHEMORRHAGIC ANEMIA: Status: ACTIVE | Noted: 2023-02-21

## 2024-04-14 PROBLEM — K63.1 PERFORATION OF INTESTINE (MULTI): Status: ACTIVE | Noted: 2023-02-21

## 2024-04-14 PROBLEM — F17.210 CIGARETTE SMOKER: Status: ACTIVE | Noted: 2022-09-15

## 2024-04-14 PROBLEM — F14.10 COCAINE ABUSE (MULTI): Status: ACTIVE | Noted: 2019-11-04

## 2024-04-14 PROBLEM — A41.9 SEPSIS, UNSPECIFIED ORGANISM (MULTI): Status: ACTIVE | Noted: 2023-02-21

## 2024-04-14 PROBLEM — K83.8 BILE LEAK, POSTOPERATIVE: Status: ACTIVE | Noted: 2023-03-05

## 2024-04-14 PROBLEM — K56.609 SMALL BOWEL OBSTRUCTION (MULTI): Status: ACTIVE | Noted: 2023-01-12

## 2024-04-14 PROBLEM — R78.81 GRAM-POSITIVE BACTEREMIA: Status: ACTIVE | Noted: 2023-02-21

## 2024-04-14 PROBLEM — S37.009A INJURY OF KIDNEY: Status: ACTIVE | Noted: 2023-01-19

## 2024-04-14 PROBLEM — R11.0 NAUSEA: Status: ACTIVE | Noted: 2023-01-19

## 2024-04-14 PROBLEM — S61.511A: Status: ACTIVE | Noted: 2022-11-09

## 2024-04-14 PROBLEM — L89.512: Status: ACTIVE | Noted: 2023-03-06

## 2024-04-14 PROBLEM — L89.154 DECUBITUS ULCER OF SACRAL REGION, STAGE 4 (MULTI): Status: ACTIVE | Noted: 2023-02-21

## 2024-04-14 PROBLEM — M54.16 LUMBAR RADICULOPATHY: Status: ACTIVE | Noted: 2020-09-16

## 2024-04-14 PROBLEM — T81.10XA POSTOPERATIVE SHOCK: Status: ACTIVE | Noted: 2023-02-06

## 2024-04-14 PROBLEM — S02.620D: Status: ACTIVE | Noted: 2023-08-22

## 2024-04-14 PROBLEM — T14.90XA INJURY, UNSPECIFIED, INITIAL ENCOUNTER: Status: RESOLVED | Noted: 2023-03-11 | Resolved: 2024-04-14

## 2024-04-14 PROBLEM — J44.9 CHRONIC OBSTRUCTIVE PULMONARY DISEASE (MULTI): Status: ACTIVE | Noted: 2021-11-02

## 2024-04-14 PROBLEM — E43 UNSPECIFIED SEVERE PROTEIN-CALORIE MALNUTRITION (MULTI): Chronic | Status: ACTIVE | Noted: 2023-02-21

## 2024-04-14 PROBLEM — I95.9 HYPOTENSION, UNSPECIFIED: Status: ACTIVE | Noted: 2023-08-18

## 2024-04-14 PROBLEM — I38 ENDOCARDITIS: Status: ACTIVE | Noted: 2023-02-10

## 2024-04-14 PROBLEM — R64 CACHEXIA (MULTI): Status: ACTIVE | Noted: 2023-01-14

## 2024-04-14 PROBLEM — F43.10 PTSD (POST-TRAUMATIC STRESS DISORDER): Status: ACTIVE | Noted: 2019-08-29

## 2024-04-14 PROBLEM — E83.42 HYPOMAGNESEMIA: Status: ACTIVE | Noted: 2023-02-21

## 2024-04-14 PROBLEM — F20.0 PARANOID SCHIZOPHRENIA (MULTI): Chronic | Status: ACTIVE | Noted: 2019-11-04

## 2024-04-14 PROBLEM — K66.1 HEMOPERITONEUM: Status: ACTIVE | Noted: 2023-01-20

## 2024-04-14 PROBLEM — R91.8 ABNORMAL FINDINGS ON DIAGNOSTIC IMAGING OF LUNG: Status: ACTIVE | Noted: 2023-01-19

## 2024-04-14 PROBLEM — F32.A DEPRESSION, UNSPECIFIED: Status: ACTIVE | Noted: 2023-08-18

## 2024-04-14 PROBLEM — K55.9 ISCHEMIA, BOWEL (MULTI): Status: RESOLVED | Noted: 2023-01-26 | Resolved: 2024-04-14
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
## 2024-04-14 PROBLEM — F17.210 NICOTINE DEPENDENCE, CIGARETTES, UNCOMPLICATED: Status: ACTIVE | Noted: 2023-01-19

## 2024-04-14 PROBLEM — C61 CARCINOMA OF PROSTATE (MULTI): Status: ACTIVE | Noted: 2021-08-23

## 2024-04-14 PROBLEM — R78.81 SALMONELLA BACTEREMIA: Status: ACTIVE | Noted: 2023-03-05

## 2024-04-14 PROBLEM — D50.8 OTHER IRON DEFICIENCY ANEMIAS: Status: ACTIVE | Noted: 2023-03-05

## 2024-04-14 PROBLEM — G47.00 INSOMNIA, UNSPECIFIED: Status: ACTIVE | Noted: 2023-08-18

## 2024-04-14 PROBLEM — E87.3 METABOLIC ALKALOSIS: Status: ACTIVE | Noted: 2024-04-14

## 2024-04-14 PROBLEM — F10.20 SEVERE ALCOHOL USE DISORDER (MULTI): Chronic | Status: ACTIVE | Noted: 2019-11-01

## 2024-04-14 PROBLEM — K91.89 BILE LEAK, POSTOPERATIVE: Status: ACTIVE | Noted: 2023-03-05

## 2024-04-14 PROBLEM — K55.059 ACUTE (REVERSIBLE) ISCHEMIA OF INTESTINE, PART AND EXTENT UNSPECIFIED (MULTI): Status: ACTIVE | Noted: 2023-02-21

## 2024-04-14 LAB
ALBUMIN SERPL BCP-MCNC: 4.3 G/DL (ref 3.4–5)
ALP SERPL-CCNC: 63 U/L (ref 33–136)
ALT SERPL W P-5'-P-CCNC: 16 U/L (ref 10–52)
ANION GAP SERPL CALC-SCNC: 14 MMOL/L (ref 10–20)
APPEARANCE UR: CLEAR
AST SERPL W P-5'-P-CCNC: 19 U/L (ref 9–39)
BASOPHILS # BLD AUTO: 0.04 X10*3/UL (ref 0–0.1)
BASOPHILS NFR BLD AUTO: 0.7 %
BILIRUB SERPL-MCNC: 0.5 MG/DL (ref 0–1.2)
BILIRUB UR STRIP.AUTO-MCNC: NEGATIVE MG/DL
BUN SERPL-MCNC: 13 MG/DL (ref 6–23)
CALCIUM SERPL-MCNC: 9.8 MG/DL (ref 8.6–10.6)
CHLORIDE SERPL-SCNC: 104 MMOL/L (ref 98–107)
CO2 SERPL-SCNC: 30 MMOL/L (ref 21–32)
COLOR UR: NORMAL
CREAT SERPL-MCNC: 1.01 MG/DL (ref 0.5–1.3)
CRP SERPL-MCNC: <0.1 MG/DL
EGFRCR SERPLBLD CKD-EPI 2021: 85 ML/MIN/1.73M*2
EOSINOPHIL # BLD AUTO: 0.23 X10*3/UL (ref 0–0.7)
EOSINOPHIL NFR BLD AUTO: 4.2 %
ERYTHROCYTE [DISTWIDTH] IN BLOOD BY AUTOMATED COUNT: 11.9 % (ref 11.5–14.5)
ERYTHROCYTE [SEDIMENTATION RATE] IN BLOOD BY WESTERGREN METHOD: 31 MM/H (ref 0–20)
GLUCOSE SERPL-MCNC: 56 MG/DL (ref 74–99)
GLUCOSE UR STRIP.AUTO-MCNC: NORMAL MG/DL
HCT VFR BLD AUTO: 44.9 % (ref 41–52)
HGB BLD-MCNC: 15.6 G/DL (ref 13.5–17.5)
IMM GRANULOCYTES # BLD AUTO: 0.01 X10*3/UL (ref 0–0.7)
IMM GRANULOCYTES NFR BLD AUTO: 0.2 % (ref 0–0.9)
KETONES UR STRIP.AUTO-MCNC: NEGATIVE MG/DL
LEUKOCYTE ESTERASE UR QL STRIP.AUTO: NEGATIVE
LYMPHOCYTES # BLD AUTO: 1.74 X10*3/UL (ref 1.2–4.8)
LYMPHOCYTES NFR BLD AUTO: 32.1 %
MCH RBC QN AUTO: 29.9 PG (ref 26–34)
MCHC RBC AUTO-ENTMCNC: 34.7 G/DL (ref 32–36)
MCV RBC AUTO: 86 FL (ref 80–100)
MONOCYTES # BLD AUTO: 0.9 X10*3/UL (ref 0.1–1)
MONOCYTES NFR BLD AUTO: 16.6 %
NEUTROPHILS # BLD AUTO: 2.5 X10*3/UL (ref 1.2–7.7)
NEUTROPHILS NFR BLD AUTO: 46.2 %
NITRITE UR QL STRIP.AUTO: NEGATIVE
NRBC BLD-RTO: 0 /100 WBCS (ref 0–0)
PH UR STRIP.AUTO: 7 [PH]
PLATELET # BLD AUTO: 221 X10*3/UL (ref 150–450)
POTASSIUM SERPL-SCNC: 3.8 MMOL/L (ref 3.5–5.3)
PROT SERPL-MCNC: 8.6 G/DL (ref 6.4–8.2)
PROT UR STRIP.AUTO-MCNC: NEGATIVE MG/DL
RBC # BLD AUTO: 5.21 X10*6/UL (ref 4.5–5.9)
RBC # UR STRIP.AUTO: NEGATIVE /UL
SODIUM SERPL-SCNC: 144 MMOL/L (ref 136–145)
SP GR UR STRIP.AUTO: 1.01
UROBILINOGEN UR STRIP.AUTO-MCNC: NORMAL MG/DL
WBC # BLD AUTO: 5.4 X10*3/UL (ref 4.4–11.3)

## 2024-04-14 PROCEDURE — 99285 EMERGENCY DEPT VISIT HI MDM: CPT | Performed by: EMERGENCY MEDICINE

## 2024-04-14 PROCEDURE — 81003 URINALYSIS AUTO W/O SCOPE: CPT | Mod: 59

## 2024-04-14 PROCEDURE — 86140 C-REACTIVE PROTEIN: CPT

## 2024-04-14 PROCEDURE — 99285 EMERGENCY DEPT VISIT HI MDM: CPT | Mod: 25

## 2024-04-14 PROCEDURE — 74177 CT ABD & PELVIS W/CONTRAST: CPT | Mod: FOREIGN READ | Performed by: RADIOLOGY

## 2024-04-14 PROCEDURE — 85025 COMPLETE CBC W/AUTO DIFF WBC: CPT

## 2024-04-14 PROCEDURE — 85652 RBC SED RATE AUTOMATED: CPT

## 2024-04-14 PROCEDURE — 2500000005 HC RX 250 GENERAL PHARMACY W/O HCPCS: Mod: SE | Performed by: EMERGENCY MEDICINE

## 2024-04-14 PROCEDURE — 2550000001 HC RX 255 CONTRASTS: Mod: SE | Performed by: STUDENT IN AN ORGANIZED HEALTH CARE EDUCATION/TRAINING PROGRAM

## 2024-04-14 PROCEDURE — 36415 COLL VENOUS BLD VENIPUNCTURE: CPT

## 2024-04-14 PROCEDURE — 80053 COMPREHEN METABOLIC PANEL: CPT

## 2024-04-14 PROCEDURE — 74177 CT ABD & PELVIS W/CONTRAST: CPT

## 2024-04-14 PROCEDURE — 80307 DRUG TEST PRSMV CHEM ANLYZR: CPT

## 2024-04-14 RX ORDER — DEXTROSE 50 % IN WATER (D50W) INTRAVENOUS SYRINGE
25 ONCE
Status: COMPLETED | OUTPATIENT
Start: 2024-04-14 | End: 2024-04-14

## 2024-04-14 RX ADMIN — DEXTROSE MONOHYDRATE 25 G: 25 INJECTION, SOLUTION INTRAVENOUS at 21:11

## 2024-04-14 RX ADMIN — IOHEXOL 80 ML: 350 INJECTION, SOLUTION INTRAVENOUS at 23:40

## 2024-04-14 SDOH — ECONOMIC STABILITY - HOUSING INSECURITY: HOUSING INSTABILITY UNSPECIFIED: Z59.819

## 2024-04-14 NOTE — PROGRESS NOTES
Progress Note      Patient Name: Shree Saenz   Patient ID: [id number]   Sex: Female   YOB: 2001        Visit Date: Theodora 10, 2020    Provider: TRINO Ferris   Location: [facility]   Location Address: [address] [place] [address], [facility]  [place], [place]  [id number]   Location Phone: [phone]          Chief Complaint  · Follow up  · Birth control       History Of Present Illness  Shree Saenz is a 18 year old female who presents for evaluation and treatment of:      Birth control counseling-  Doing well on current birth control, requesting refill. Previously started due to heavy periods by [place]. Patient takes medication as prescribed, has not missed doses. LMP: Currently. Periods regular. Patient is not a smoker. Denies cardiovascular disease.    Allergic rhinitis-  Well controlled with Allegra and Singulair.     GERD-  Patient reports reflux symptoms, always occur after eating. States she will have a burning sensation in her chest and sometimes vomits a little in her mouth. Reports symptoms most days. Exacerbated by certain foods.    Chest pain-  Patient reports a sharp, shooting chest pain that occurs once every few weeks. Pain starts on the left side of her chest and radiates under her left breast. Pain may last up to an hour, resolves on its own. Has associated shortness of breath. Denies diaphoresis, headache, blurred vision, leg swelling. Symptoms seem to happen when she is anxious, occur at rest. This pain is different than GERD symptoms. States sometimes she can feel her heart beating.           Past Medical History  Disease Name Date Onset Notes   Acne --  --    Seasonal allergies --  --          Medication List  Name Date Started Instructions   Allegra Allergy 180 mg oral tablet 06/10/2020 take 1 tablet (180 mg) by oral route once daily   Cyclafem 1/35 (28) 1-35 mg-mcg oral tablet 06/10/2020 take 1 tablet by oral route once daily for 30 days   ferrous sulfate 325  I received Bjonr Cedeno in signout from Dr. Del Cid.  Please see the previous note for all HPI, PE and MDM up to the time of signout at 19:00.    In brief Bjorn Cedeno is an 58 y.o. male presenting for No chief complaint on file.  .  At the time of signout we were awaiting:  Pending sepsis workup and CT AP    After assuming care of this patient, CBC, CMP, CRP, and ESR were completed which were unremarkable aside from an ESR of 31. UA was negative. CT AP  demonstrated evidence of prior wound dehiscence with thickening along the anterior abdominal wall fascia but does not contain any drainable fluid collections or locules of air. No drainable abscess within the intra-abdominal soft tissues. Patient is currently pending social work in the AM for admission to SNF. General surgery consulted for wound care as patient has had poor and interrupted wound care while incarcerated.      Pt Disposition: Continued care in ED.         "mg (65 mg iron) oral tablet  take 1 tablet (325 mg) by oral route once daily   Singulair 10 mg oral tablet 06/10/2020 take 1 tablet (10 mg) by oral route once daily in the evening for 30 days         Allergy List  Allergen Name Date Reaction Notes   NO KNOWN DRUG ALLERGIES --  --  --        Allergies Reconciled  Family Medical History  Disease Name Relative/Age Notes   Lung cancer  --          Review of Systems  · Constitutional  o Denies  o : fever, fatigue, weight loss, weight gain  · Cardiovascular  o Admits  o : chest pressure  o Denies  o : lower extremity edema, palpitations  · Respiratory  o Admits  o : shortness of breath  o Denies  o : wheezing, cough, dyspnea on exertion  · Gastrointestinal  o Admits  o : heartburn, reflux  o Denies  o : nausea, vomiting, abdominal pain  · Genitourinary  o Denies  o : irregular menses, vaginal discharge, possible pregnancy      Vitals  Date Time BP Position Site L\R Cuff Size HR RR TEMP (F) WT  HT  BMI kg/m2 BSA m2 O2 Sat        05/10/2019 03:32 /82 Sitting    79 - R 16 97.7 179lbs 0oz 5'  7\" 28.04 1.96 100 %    06/10/2020 03:57 /76 Sitting    100 - R  98.5 179lbs 0oz 5'  7\" 28.04 1.96 100 %          Physical Examination  · Constitutional  o Appearance  o : no acute distress, well-nourished  · Head and Face  o Head  o :   § Inspection  § : atraumatic, normocephalic  · Ears, Nose, Mouth and Throat  o Ears  o :   § External Ears  § : normal  o Nose  o :   § Intranasal Exam  § : nares patent  o Oral Cavity  o :   § Oral Mucosa  § : moist mucous membranes  · Neck  o Thyroid  o : gland size normal, nontender, no nodules or masses present on palpation, thyroid motion normal during swallowing  · Respiratory  o Respiratory Effort  o : breathing comfortably, symmetric chest rise  o Auscultation of Lungs  o : clear to asculatation bilaterally, no wheezes, rales, or rhonchii  · Cardiovascular  o Heart  o :   § Auscultation of Heart  § : regular rate and rhythm, no " murmurs, rubs, or gallops  o Peripheral Vascular System  o :   § Extremities  § : no edema  · Skin and Subcutaneous Tissue  o General Inspection  o : no rashes present, no lesions present, no areas of discoloration  · Neurologic  o Mental Status Examination  o :   § Orientation  § : grossly oriented to person, place and time  o Gait and Station  o :   § Gait Screening  § : normal gait              Assessment  · Screening for lipid disorders     V77.91/Z13.220  Lipid panel in clinic today.  · Chest pain     786.50/R07.9  EKG in clinic WNL. Labs, including thyroid. Will schedule for Holter monitor. Discussed with patient that symptoms may be secondary to anxiety or GERD. Will determine if further intervention is necessary based on results. Patient to seek medical attention immediately with severe/persistent chest pain, shortness of breath, diaphoresis. Follow up in one month, sooner if concerns arise.  · GERD (gastroesophageal reflux disease)     530.81/K21.9  Will trial pantoprazole. Provided patient education on avoiding triggers, not lying flat at least 30 minutes after eating, sleeping with the HOB elevated. Will follow up in one month to assess medication effectiveness, sooner if concerns arise.  · Birth control counseling     V25.09/Z30.09  Will refill birth control. Patient aware of risks of oral contraceptive pills. Discussed refraining from nicotine use and cardiovascular risk. Educated patient on the importance of taking pills daily, that birth control pills do not prevent against sexually transmitted diseases and that condoms should also be used for further protection.   · Allergic rhinitis     477.9/J30.9  Well controlled, continue Allegra and Singulair.   · Palpitations     785.1/R00.2    Problems Reconciled  Plan  · Orders  o Lipid Panel [facility] ([id number]) - V77.91/Z13.220 - 06/10/2020  o CMP [facility] ([id number]) - 786.50/R07.9 - 06/10/2020  o CBC with Auto Diff [facility] ([id number]) - 786.50/R07.9 - 06/10/2020  o TSH [facility] ([id number])  - 786.50/R07.9 - 06/10/2020  o ACO-39: Current medications updated and reviewed () - - 06/10/2020  o Holter Monitor 24 Hour [facility] ([id number]) - 786.50/R07.9, 785.1/R00.2 - 06/10/2020  · Medications  o pantoprazole 20 mg oral tablet,delayed release (DR/EC)   SIG: take 1 tablet (20 mg) by oral route once daily   DISP: (30) tablets with 1 refills  Prescribed on 06/10/2020     o Medications have been Reconciled  o Transition of Care or Provider Policy  · Instructions  o Take all medications as prescribed/directed.  o Patient was educated/instructed on their diagnosis, treatment and medications prior to discharge from the clinic today.  o Patient instructed to seek medical attention urgently for new or worsening symptoms.  o Call the office with any concerns or questions.  · Disposition  o Call or Return if symptoms worsen or persist.  o Follow up in 1 month  o Prescriptions sent to pharmacy  o Labs drawn in clinic  o Will call patient with results of labs  o EKG performed in clinic            Electronically Signed by: TRINO Ferris -Author on Theodora 10, 2020 04:36:38 PM

## 2024-04-14 NOTE — PROGRESS NOTES
"    ED SOCIAL WORK NOTE:     Bjorn Cedeno is a 58 y.o. male, currently in the ED. Patient has a wound from a surgery one year ago that is not fully healed. Patient states he \"got in trouble for the first time since 1999\" at [facility] , when a roommate made a comment to him and patient reports he was arrested.  Patient was found guilty for attempted aggravated assault, burglary, and tampering with evidence, sentenced to some long term time and is now required to complete the programming within the [facility] setting he currently lives in.  Patient states, he moved from [facility], to [facility] [place], and has also been at [facility] (all [facility], correctional institutions).  Patient requesters to call his  ([address] [address], [place] [phone]).  is [phone].   This writer left a message for PO, told patient if he qualifies for SNF that he may be able to go, but he dos need to finish the required programming at his facility after SNF or before.  Patient denied homeless resources, stating he can obtain.    Addendum, 459p:  LUIS spoke to an employee at the [facility] ([address] [place] [address]), who states that patient must call the facility EVERY HOUR - and when he moves locations to confirm compliance with his probation.     Anticipated Plan: Patient requesting SNF placement  ADOD: abigail BARR LCSW        "

## 2024-04-14 NOTE — ED PROVIDER NOTES
"HPI   No chief complaint on file.      HPI  58 year old Male with history of COPD, lung nodules, bronchiectasis, prostate cancer who underwent exploratory laparotomy after leaving AMA for small bowel obstruction in January 2023 presents today for increasing wound drainage and unhappiness with his current living situation.  He states over the last few days, he has had increasing serosanguineous drainage from his wound along with deep abdominal pain.  No nausea or vomiting but states he is having difficulty having bowel movements because it hurts to strain.  His last bowel movement was 2 days ago and was normal.  He endorses subjective fevers and chills at home.  Patient perseverates on the fact that he is living in \"a penitentiary\".  He is unable to tell me if this is a rehabilitation or SNF.  He states he is asked to call their every hour.  He states that he is unhappy because the Co[place] residents play basketball and come back and sweaty, he states that his doctor told him that he should not be around other people or will infect his wound.                  No data recorded                   Patient History   No past medical history on file.  Past Surgical History:   Procedure Laterality Date    CT ANGIO NECK  8/13/2023    CT ANGIO NECK 8/13/2023     No family history on file.  Social History     Tobacco Use    Smoking status: Never    Smokeless tobacco: Never   Substance Use Topics    Alcohol use: Not Currently    Drug use: Not on file       Physical Exam   ED Triage Vitals [04/14/24 1718]   Temperature Heart Rate Respirations BP   36.6 °C (97.9 °F) 78 16 (!) 148/96      Pulse Ox Temp src Heart Rate Source Patient Position   99 % -- -- --      BP Location FiO2 (%)     -- 21 %       Physical Exam  Vitals and nursing note reviewed.   Constitutional:       General: He is not in acute distress.  HENT:      Head: Normocephalic.   Eyes:      Extraocular Movements: Extraocular movements intact.      Pupils: Pupils are equal, round, " and reactive to light.   Cardiovascular:      Rate and Rhythm: Normal rate and regular rhythm.      Heart sounds: No murmur heard.  Pulmonary:      Effort: Pulmonary effort is normal.      Breath sounds: Normal breath sounds. No wheezing or rhonchi.   Abdominal:      General: Abdomen is flat.      Palpations: Abdomen is soft.      Comments: Midline surgical excision scar.  There is an area of red granulation appearing tissue towards the base with some mild serosanguineous drainage.  No surrounding erythema or purulence.  He states it is generally sore to palpation.   Musculoskeletal:         General: Normal range of motion.   Skin:     General: Skin is warm and dry.   Neurological:      General: No focal deficit present.      Mental Status: He is alert and oriented to person, place, and time.   Psychiatric:         Mood and Affect: Mood normal.         Speech: Speech is tangential.         Thought Content: Thought content normal.         ED Course & MDM   Diagnoses as of 04/14/24 1848   Chronic wound infection of abdomen, initial encounter   Housing instability       Medical Decision Making  This is a 58-year-old male with complicated past medical history presenting for concern of wound drainage along with seeking social work.  Patient is hemodynamically stable on arrival, overall well-appearing with a chronic appearing wound with some serosanguineous drainage.  He is hemodynamically stable, afebrile and slightly hypertensive on arrival.  Patient is endorsing intra-abdominal pain and worsening of his baseline chronic wound, given complex medical history we will obtain a CT abdomen pelvis along with CBC, CMP sed rate and CRP.  Patient was signed out pending imaging and lab work.  Please see their note for final disposition and assessment.    Elza Del Cid DO  Emergency Medicine  [facility]  PGY-1    Procedure  Procedures     Elza Del Cid DO  Resident  04/14/24 6848

## 2024-04-15 PROBLEM — T81.30XA WOUND DEHISCENCE: Status: ACTIVE | Noted: 2024-04-15

## 2024-04-15 LAB
AMPHETAMINES UR QL SCN: NORMAL
BARBITURATES UR QL SCN: NORMAL
BENZODIAZ UR QL SCN: NORMAL
BZE UR QL SCN: NORMAL
CANNABINOIDS UR QL SCN: NORMAL
FENTANYL+NORFENTANYL UR QL SCN: NORMAL
METHADONE UR QL SCN: NORMAL
OPIATES UR QL SCN: NORMAL
OXYCODONE+OXYMORPHONE UR QL SCN: NORMAL
PCP UR QL SCN: NORMAL

## 2024-04-15 PROCEDURE — 2500000004 HC RX 250 GENERAL PHARMACY W/ HCPCS (ALT 636 FOR OP/ED): Mod: SE

## 2024-04-15 PROCEDURE — G0378 HOSPITAL OBSERVATION PER HR: HCPCS

## 2024-04-15 PROCEDURE — 99221 1ST HOSP IP/OBS SF/LOW 40: CPT | Performed by: SURGERY

## 2024-04-15 PROCEDURE — 97116 GAIT TRAINING THERAPY: CPT | Mod: GP

## 2024-04-15 PROCEDURE — 97161 PT EVAL LOW COMPLEX 20 MIN: CPT | Mod: GP

## 2024-04-15 RX ORDER — ACETAMINOPHEN 325 MG/1
650 TABLET ORAL EVERY 4 HOURS PRN
Status: DISCONTINUED | OUTPATIENT
Start: 2024-04-15 | End: 2024-04-21 | Stop reason: HOSPADM

## 2024-04-15 RX ORDER — ONDANSETRON 4 MG/1
4 TABLET, FILM COATED ORAL EVERY 8 HOURS PRN
Status: DISCONTINUED | OUTPATIENT
Start: 2024-04-15 | End: 2024-04-21 | Stop reason: HOSPADM

## 2024-04-15 RX ORDER — ACETAMINOPHEN 325 MG/1
975 TABLET ORAL ONCE
Status: COMPLETED | OUTPATIENT
Start: 2024-04-15 | End: 2024-04-15

## 2024-04-15 RX ORDER — POLYETHYLENE GLYCOL 3350 17 G/17G
17 POWDER, FOR SOLUTION ORAL DAILY
Status: DISCONTINUED | OUTPATIENT
Start: 2024-04-15 | End: 2024-04-21 | Stop reason: HOSPADM

## 2024-04-15 RX ADMIN — ACETAMINOPHEN 650 MG: 325 TABLET ORAL at 18:59

## 2024-04-15 RX ADMIN — ACETAMINOPHEN 975 MG: 325 TABLET ORAL at 10:32

## 2024-04-15 RX ADMIN — POLYETHYLENE GLYCOL 3350 17 G: 17 POWDER, FOR SOLUTION ORAL at 17:06

## 2024-04-15 ASSESSMENT — COGNITIVE AND FUNCTIONAL STATUS - GENERAL
WALKING IN HOSPITAL ROOM: A LITTLE
TURNING FROM BACK TO SIDE WHILE IN FLAT BAD: A LITTLE
MOBILITY SCORE: 17
CLIMB 3 TO 5 STEPS WITH RAILING: A LOT
STANDING UP FROM CHAIR USING ARMS: A LITTLE
MOVING TO AND FROM BED TO CHAIR: A LITTLE
MOVING FROM LYING ON BACK TO SITTING ON SIDE OF FLAT BED WITH BEDRAILS: A LITTLE

## 2024-04-15 ASSESSMENT — PAIN SCALES - GENERAL
PAINLEVEL_OUTOF10: 8
PAINLEVEL_OUTOF10: 0 - NO PAIN

## 2024-04-15 ASSESSMENT — PAIN - FUNCTIONAL ASSESSMENT: PAIN_FUNCTIONAL_ASSESSMENT: 0-10

## 2024-04-15 ASSESSMENT — ENCOUNTER SYMPTOMS
ABDOMINAL PAIN: 1
WOUND: 1

## 2024-04-15 ASSESSMENT — ACTIVITIES OF DAILY LIVING (ADL): ADL_ASSISTANCE: INDEPENDENT

## 2024-04-15 NOTE — H&P
History and Physical  [facility] EMERGENCY MEDICINE  Patient: Bjorn Cedeno  MRN: [id number]  : 1965  Date of Evaluation: 2024  ED Provider: VAMSHI Malone-CNP          Patient History:  Bjorn Cedeno is a 58-year-old male with history of COPD, lung nodules, bronchiectasis, prostate cancer who underwent exploratory laparotomy after leaving AMA for small bowel obstruction in 2023 admitted to the CDU for wound dehiscence.     Initially presented to the ED with chief complaint of increasing wound drainage and unhappiness with his current living situation.  Endorses worsening serosanguineous drainage over the last few days with his wound as well as some deep abdominal pain.  Denied nausea or vomiting.  Endorsed some difficulty having bowel movements with some constipation and straining.  Last bowel movement approximately 3 days ago now.  Also endorsed chills and myalgia.  Denies chest pain or dyspnea.  Diagnostic testing was performed and general surgery was consulted.  Please see their note for full details and recommendations.  Workup was relatively reassuring.  Patient did have elevated sed rate but CRP was unremarkable.  CBC with differential showed no looks leukocytosis or leukopenia.  CMP relatively unremarkable.  Urinalysis within normal ranges.  CT abdomen pelvis with contrast showed evidence of prior wound dehiscence with thickening along the anterior abdominal wall fascia but does not contain any drainable fluid collections or locules of air.  No drainable abscess within the intra-abdominal soft tissues.  General surgery was able to see the patient and weigh in on him.  They recommend daily dressing changes with Aquacel or wet to dry with normal saline.  They recommend ensuring appropriate nutrition.  Recommend wound care education.  Recommend social work consult.  No acute interventions recommended at this time.  Patient was admitted to the CDU and in agreement with  this plan.    On arrival to the CDU, patient endorses feeling improved since arrival.  Pain improved after Tylenol.  Denies any other complaints besides the above.  States that he would be willing to go to a SNF if possible.  Social work following the patient.      The acute evaluation included:  Orders Placed This Encounter   Procedures    CT abdomen pelvis w IV contrast    CBC and Auto Differential    Comprehensive metabolic panel    Sedimentation rate, automated    C-reactive protein    Urinalysis with Reflex Microscopic    Inpatient consult to Acute Care Surgery    Inpatient consult to Social Work and TCC    OT eval and treat    PT eval and treat    Send to CDU         Past History   No past medical history on file.  Past Surgical History:   Procedure Laterality Date    CT ANGIO NECK  8/13/2023    CT ANGIO NECK 8/13/2023     Social History     Socioeconomic History    Marital status: Single     Spouse name: Not on file    Number of children: Not on file    Years of education: Not on file    Highest education level: Not on file   Occupational History    Not on file   Tobacco Use    Smoking status: Never    Smokeless tobacco: Never   Substance and Sexual Activity    Alcohol use: Not Currently    Drug use: Not on file    Sexual activity: Not Currently   Other Topics Concern    Not on file   Social History Narrative    Not on file     Social Determinants of Health     Financial Resource Strain: High Risk (2/3/2024)    Overall Financial Resource Strain (CARDIA)     Difficulty of Paying Living Expenses: Very hard   Food Insecurity: Patient Declined (2/3/2024)    Hunger Vital Sign     Worried About Running Out of Food in the Last Year: Patient declined     Ran Out of Food in the Last Year: Patient declined   Transportation Needs: Unmet Transportation Needs (2/3/2024)    PRAPARE - Transportation     Lack of Transportation (Medical): Yes     Lack of Transportation (Non-Medical): Yes   Physical Activity: Patient Declined  "(2/3/2024)    Exercise Vital Sign     Days of Exercise per Week: Patient declined     Minutes of Exercise per Session: Patient declined   Stress: Patient Declined (2/3/2024)    [employer] [place] [employer] - Occupational Stress Questionnaire     Feeling of Stress : Patient declined   Social Connections: Not on File (8/27/2019)    Received from [employer]     Social Connections and Isolation: 0   Intimate Partner Violence: Not on file   Housing Stability: Unknown (2/3/2024)    Housing Stability Vital Sign     Unable to Pay for Housing in the Last Year: Patient declined     Number of Places Lived in the Last Year: Not on file     Unstable Housing in the Last Year: No         Medications/Allergies     Previous Medications    ACETAMINOPHEN (TYLENOL) 325 MG TABLET    Take 2 tablets (650 mg) by mouth every 6 hours if needed for mild pain (1 - 3).    ATORVASTATIN (LIPITOR) 40 MG TABLET    Take 1 tablet (40 mg) by mouth once daily at bedtime.    BENZTROPINE (COGENTIN) 1 MG TABLET    Take 1 tablet (1 mg) by mouth 2 times a day.    DOXEPIN (SINEQUAN) 50 MG CAPSULE    Take 1 capsule (50 mg) by mouth once daily at bedtime.    GABAPENTIN (NEURONTIN) 300 MG CAPSULE    Take 1 capsule (300 mg) by mouth once daily.    HYDROXYZINE HCL (ATARAX) 50 MG TABLET    Take 1 tablet (50 mg) by mouth 2 times a day as needed for anxiety.    NON-ADHERENT BANDAGE (CURITY ABDOMINAL PAD) 8 X 10 \" BANDAGE    Apply 1 each topically once daily.    RISPERIDONE (RISPERDAL) 2 MG TABLET    Take 1 tablet (2 mg) by mouth 2 times a day.    SILVER-FOAM BANDAGE (AQUACEL AG FOAM) 1.2 %- 8\" X 8\" BANDAGE    Apply 1 each topically once daily.    TAMSULOSIN (FLOMAX) 0.4 MG 24 HR CAPSULE    Take 1 capsule (0.4 mg) by mouth once daily.     Allergies   Allergen Reactions    Ibuprofen Unknown           Review of Systems  All systems reviewed and otherwise negative, except as stated above in HPI.      Physical Exam     Visit Vitals  BP " "111/85   Pulse 65   Temp 36.4 °C (97.6 °F) (Temporal)   Resp 16   Ht 1.88 m (6' 2\")   Wt 82.6 kg (182 lb)   SpO2 95%   BMI 23.37 kg/m²   Smoking Status Never   BSA 2.08 m²       GENERAL:  The patient appears nourished and normally developed. Vital signs as documented.     HEENT:  Head normocephalic, atraumatic, EOMs intact, PERRLA, Mucous membranes moist. Nares patent without copious rhinorrhea.  No lymphadenopathy.    PULMONARY:  Lungs are clear to auscultation, without any respiratory distress. Able to speak full sentences, no accessory muscle use    CARDIAC:   Normal rate. No murmurs, rubs or gallops    ABDOMEN:  Soft, mildly tender to palpation around midline wound.  No guarding or distention.  Surgical abdominal wound around the midline area clean with scar tissue on the upper abdomen.  Amount of serosanguineous drainage was noted.  No purulence or erythema.  No bleeding.  Bowel sounds normal.  No pain McBurney's point.  No Vincent sign.    : External exam unremarkable, speculum exam with no discharge, no bleeding, no adnexal tenderness or masses    MUSCULOSKELETAL:   Able to ambulate, Non edematous, with no obvious deformities. Pulses intact distal    SKIN:   Good color, with no significant rashes.  No pallor.    NEURO:  No obvious neurological deficits, normal sensation and strength bilaterally.  Able to follow commands, NIH 0, CN 2-12 intact.    Psych: Appropriate mood and affect    Consultants  1) General surgery      Impression and Plan  In summary, Bjorn Cedeno is admitted to the [facility] Clinical Decision Unit for wound dehiscence and need for SNF placement. Dr. Solis is the CDU admission attending.    This patient has been risk-stratified based on available history, physical exam, and related study findings. Admission to the observation status for further diagnosis/treatment/monitoring of wound dehiscence and need for SNF placement is warranted clinically. This extended " period of observation is specifically required to determine the need for hospitalization.     The goals of this admission based on the patient’s clinical problem list are:  1) Ensure that the patient learns proper wound care and no further dehiscence occurs  2) ensure appropriate pain control provided  3) ensure the most appropriate dispo for the patient, likely SNF per ED admission providers.    When met, appropriate disposition will be arranged.        Assessment and Plan:    # Wound dehiscence  -General surgery consulted, please see their note for full details and recommendations.  Dressing changes and wound education for the patient.  -Likely to go to SNF.  Social work, PT, OT working on this.  -Monitor vital signs per unit protocol.  -Pain control as needed.  -CT abdomen pelvis with contrast was reassuring and showed no evidence of abscess.  -Monitor for wound decompensation, including with bleeding, erythema, warmth, or purulence.  Monitor for signs of sepsis.

## 2024-04-15 NOTE — PROGRESS NOTES
Bjorn Cedeno is a 58 y.o. male       SNF referral sent to pt [facility]. Pending response.     Gale Parisi MSW LSW

## 2024-04-15 NOTE — PROGRESS NOTES
Emergency Medicine Transition of Care Note.    I received Bjorn Cedeno in signout from Dr. Valdez.  Please see the previous ED provider note for all HPI, PE and MDM up to the time of signout at 0700. This is in addition to the primary record.    In brief Bjorn Cedeno is an 58 y.o. male presenting for No chief complaint on file.    At the time of signout we were awaiting: PT/OT evaluation for SNF placement and surgical consultation.    ED Course as of 04/15/24 1529   Mon Apr 15, 2024   1156 Electrocardiogram 12 Lead [RB]   1510 Patient seen in conjunction with assigned resident, agree with workup and plan.  Chronic abdominal wound, CT negative, cleared by ACS.  Pending SW workup for PT/OT and SNF placement. [BK]      ED Course User Index  [BK] Brit Adams MD  [RB] Estefania Hernandez, DO         Diagnoses as of 04/15/24 1529   Chronic wound infection of abdomen, initial encounter   Housing instability       Medical Decision Making  In brief this is a 58-year-old male with a chronic wound dehiscence status post ex lap approximately 1 year ago.  Patient presented with serosanguineous discharge from his surgical site and has been unable to get appropriate wound care.  Patient was previously incarcerated and while incarcerated was getting appropriate wound care however since he has been in a correction house and he has been unable to appropriately manage this.  At the time of signout we are pending PT/OT evaluation and social work placement to SNF facility.  Additionally general surgery service had been consulted and evaluated the patient in the ED. Surgery team stated that this looks more like superficial dehiscence with possible fat necrosis that will heal by secondary intention.  They did not feel that any acute surgical intervention is indicated at this time. Surgery recommended daily dressing change. They recommend that the patient be evaluated by social work and be placed in a facility with appropriate and  adequate wound care.  Their recommendations included daily dressing change with Aquacel or wet-to-dry with normal saline.  Patient was evaluated by PT and OT in the ED however their recommendations are still pending. ED social work has been involved and is working on SNF placement for the patient after PT/OT evaluation and their recommendations. Social work report that they feel they can get the patient to a SNF tomorrow. The patient has been accepted for admission to CDU for further social work evaluation and placement to SNF facility. Patient remains stable at this time.    Final diagnoses:   [S31.109A, L08.9] Chronic wound infection of abdomen, initial encounter   [Z59.819] Housing instability         Procedure  Procedures    Mindy Trivedi DO     Send to CDU

## 2024-04-15 NOTE — CONSULTS
[place]  ACUTE CARE SURGERY - HISTORY AND PHYSICAL / CONSULT    Patient Name: Bjorn Cedeno  MRN: [id number]  Admit Date: [id number]  : 1965  AGE: 58 y.o.   GENDER: male  ==============================================================================  TODAY'S ASSESSMENT AND PLAN OF CARE:  58 y.o. male with history of COPD, lung nodules, bronchiectasis, prostate cancer who & Ex lap, BROOKE with small bowel resection with anastomosis after leaving [facility] for small bowel obstruction in 2023. ACS consulted for wound dehiscence evaluation. Wound evaluated and dressing placed at bedside. It is likely superficial dehiscence with possible fat necrosis that will heal by secondary intention with appropriate wound care. No surgical intervention at this time. However given patient living situation interfering with adequate wound care would recommend SW assistance with living situation prior to discharge from ED.     Recommendations:  - Daily dressing change with Aquacel or Wet to dry with NS  - Ensure appropriate nutrition  - Wound care education   - SW consult for living situation & outpatient wound care    Discussed with attending physician Dr. Lopez.    Kiara Peralta MD  Family Medicine  PGY-1  ACS [id number].     ==============================================================================  CHIEF COMPLAINT/REASON FOR CONSULT:  58 y.o. male with history of COPD, lung nodules, bronchiectasis, prostate cancer who & Ex lap, BROOKE with small bowel resection with anastomosis after leaving [facility] for small bowel obstruction in 2023. Presents today for non-healing abdominal wound with increasing drainage. ACS consulted for abdominal wound evaluation. To note patient presented multiple times to ED for similar complaint. Patient reports abdominal pain around wound site. He endorses subjective fevers and chills at home. Otherwise no n/v. CT A/P obtained in ED, reviewed showing previous  "wound dehiscence with thickening along the anterior abdominal wall fascia without underlying fluid collection. Patient expressing multiple social concerns about living situation and poor access to health care    PAST MEDICAL HISTORY:   PMH: COPD, lung nodules, bronchiectasis, prostate cancer    PSH: Ex lap, BROOKE, small bowel resection with anastomosis 01/15/2023  Past Surgical History:   Procedure Laterality Date    CT ANGIO NECK  8/13/2023    CT ANGIO NECK 8/13/2023     FH:   No family history on file.    SOCIAL HISTORY:  Social History     Tobacco Use   Smoking Status Never   Smokeless Tobacco Never     Social History     Substance and Sexual Activity   Alcohol Use Not Currently       MEDICATIONS:   Prior to Admission medications    Medication Sig Start Date End Date Taking? Authorizing Provider   acetaminophen (Tylenol) 325 mg tablet Take 2 tablets (650 mg) by mouth every 6 hours if needed for mild pain (1 - 3). 2/6/24   Viktoria Gibson MD   atorvastatin (Lipitor) 40 mg tablet Take 1 tablet (40 mg) by mouth once daily at bedtime. 2/6/24 3/7/24  Viktoria Gibson MD   benztropine (Cogentin) 1 mg tablet Take 1 tablet (1 mg) by mouth 2 times a day. 1/2/24   Historical Provider, MD   doxepin (SINEquan) 50 mg capsule Take 1 capsule (50 mg) by mouth once daily at bedtime. 1/2/24   Historical Provider, MD   gabapentin (Neurontin) 300 mg capsule Take 1 capsule (300 mg) by mouth once daily. 1/15/24   Historical Provider, MD   hydrOXYzine HCL (Atarax) 50 mg tablet Take 1 tablet (50 mg) by mouth 2 times a day as needed for anxiety. 1/2/24   Historical Provider, MD   non-adherent bandage (Curity Abdominal Pad) 8 X 10 \" bandage Apply 1 each topically once daily. 2/8/24   Viktoria Gibson MD   risperiDONE (RisperDAL) 2 mg tablet Take 1 tablet (2 mg) by mouth 2 times a day. 1/2/24   Historical Provider, MD   silver-foam bandage (Aquacel AG Foam) 1.2 %- 8\" X 8\" bandage Apply 1 each topically once daily. 2/8/24   Viktoria DUDLEY" MD Paige   tamsulosin (Flomax) 0.4 mg 24 hr capsule Take 1 capsule (0.4 mg) by mouth once daily.    Historical Provider, MD     ALLERGIES:   Allergies   Allergen Reactions    Ibuprofen Unknown       REVIEW OF SYSTEMS:  Review of Systems   Gastrointestinal:  Positive for abdominal pain.   Skin:  Positive for wound.       PHYSICAL EXAM:  Physical Exam  General: well-appearing, NAD  HEENT: NC/AT  Cardiac: perfusing well  Lungs: normal work of breathing  Abdomen: soft, mildly tender to palpation around midline wound, non-distended, no guarding, no rebound.  Midline surgical abdominal wound clean with scar tissue on upper abdomen. Lower abdomen wound with pink granulation tissue with fibrinous tissue on the edges. Small amount of serosanguinous drainage cleaned at bedside. No surrounding erythema or purulence.    Neuro: grossly intact  MSK: No peripheral edema, cyanosis, or pallor  Psych: no depression, anxiety      IMAGING SUMMARY:  (summary of findings, not a copy of dictation)  CT A/P 4/15 showing previous wound dehiscence with thickening along the anterior abdominal wall fascia without underlying fluid collection.     LABS:  Results from last 7 days   Lab Units 04/14/24  1750   WBC AUTO x10*3/uL 5.4   HEMOGLOBIN g/dL 15.6   HEMATOCRIT % 44.9   PLATELETS AUTO x10*3/uL 221   NEUTROS PCT AUTO % 46.2   LYMPHS PCT AUTO % 32.1   MONOS PCT AUTO % 16.6   EOS PCT AUTO % 4.2         Results from last 7 days   Lab Units 04/14/24  1750   SODIUM mmol/L 144   POTASSIUM mmol/L 3.8   CHLORIDE mmol/L 104   CO2 mmol/L 30   BUN mg/dL 13   CREATININE mg/dL 1.01   CALCIUM mg/dL 9.8   PROTEIN TOTAL g/dL 8.6*   BILIRUBIN TOTAL mg/dL 0.5   ALK PHOS U/L 63   ALT U/L 16   AST U/L 19   GLUCOSE mg/dL 56*     Results from last 7 days   Lab Units 04/14/24  1750   BILIRUBIN TOTAL mg/dL 0.5         I have reviewed all laboratory and imaging results ordered/pertinent for this encounter.

## 2024-04-15 NOTE — PROGRESS NOTES
Bjorn Cedeno is a 58 y.o. male     Plan per medical team: SNF placement  -Payer: Medicaid [place]  -Status: ED  -Discharge disposition: SNF with wound care  -Potential Barriers:   -Anticipated Date of Discharge:  4/16/24    [facility]. Osmar spoke to supervisor Cordelia who stated pt can go to SNF if rec and that she will updated the  on his location. Pt will be required to complete his sentence at [facility] once he is medically cleared from the SNF. Osmar requested PT/OT    Gale Parisi MSW LSW

## 2024-04-16 PROBLEM — R78.81 SALMONELLA BACTEREMIA: Status: RESOLVED | Noted: 2023-03-05 | Resolved: 2024-04-16

## 2024-04-16 PROBLEM — E03.9 HYPOTHYROIDISM: Status: ACTIVE | Noted: 2021-12-07

## 2024-04-16 PROBLEM — K83.8 BILE LEAK, POSTOPERATIVE: Status: RESOLVED | Noted: 2023-03-05 | Resolved: 2024-04-16

## 2024-04-16 PROBLEM — A41.9 SEPSIS, UNSPECIFIED ORGANISM (MULTI): Status: RESOLVED | Noted: 2023-02-21 | Resolved: 2024-04-16

## 2024-04-16 PROBLEM — D63.8 ANEMIA IN OTHER CHRONIC DISEASES CLASSIFIED ELSEWHERE: Status: RESOLVED | Noted: 2023-01-19 | Resolved: 2024-04-16

## 2024-04-16 PROBLEM — R91.1 SOLITARY PULMONARY NODULE: Status: ACTIVE | Noted: 2022-05-02

## 2024-04-16 PROBLEM — K63.1 PERFORATION OF INTESTINE (MULTI): Status: RESOLVED | Noted: 2023-02-21 | Resolved: 2024-04-16

## 2024-04-16 PROBLEM — R41.0 DELIRIUM: Status: RESOLVED | Noted: 2023-01-24 | Resolved: 2024-04-16

## 2024-04-16 PROBLEM — T81.10XA POSTOPERATIVE SHOCK: Status: RESOLVED | Noted: 2023-02-06 | Resolved: 2024-04-16

## 2024-04-16 PROBLEM — L89.154 DECUBITUS ULCER OF SACRAL REGION, STAGE 4 (MULTI): Status: RESOLVED | Noted: 2023-02-21 | Resolved: 2024-04-16

## 2024-04-16 PROBLEM — F17.210 CIGARETTE SMOKER: Status: RESOLVED | Noted: 2022-09-15 | Resolved: 2024-04-16

## 2024-04-16 PROBLEM — H65.02 ACUTE SEROUS OTITIS MEDIA OF LEFT EAR: Status: RESOLVED | Noted: 2022-09-15 | Resolved: 2024-04-16

## 2024-04-16 PROBLEM — E87.3 METABOLIC ALKALOSIS: Status: RESOLVED | Noted: 2024-04-14 | Resolved: 2024-04-16

## 2024-04-16 PROBLEM — R52 MECHANICAL PAIN: Status: RESOLVED | Noted: 2024-01-29 | Resolved: 2024-04-16

## 2024-04-16 PROBLEM — R78.81 GRAM-POSITIVE BACTEREMIA: Status: RESOLVED | Noted: 2023-02-21 | Resolved: 2024-04-16

## 2024-04-16 PROBLEM — J47.9 BRONCHIECTASIS (MULTI): Status: RESOLVED | Noted: 2022-05-02 | Resolved: 2024-04-16

## 2024-04-16 PROBLEM — E83.42 HYPOMAGNESEMIA: Status: RESOLVED | Noted: 2023-02-21 | Resolved: 2024-04-16

## 2024-04-16 PROBLEM — R91.8 ABNORMAL FINDINGS ON DIAGNOSTIC IMAGING OF LUNG: Status: RESOLVED | Noted: 2023-01-19 | Resolved: 2024-04-16

## 2024-04-16 PROBLEM — I95.9 HYPOTENSION, UNSPECIFIED: Status: RESOLVED | Noted: 2023-08-18 | Resolved: 2024-04-16

## 2024-04-16 PROBLEM — S61.511A: Status: RESOLVED | Noted: 2022-11-09 | Resolved: 2024-04-16

## 2024-04-16 PROBLEM — K91.89 BILE LEAK, POSTOPERATIVE: Status: RESOLVED | Noted: 2023-03-05 | Resolved: 2024-04-16

## 2024-04-16 PROBLEM — K66.1 HEMOPERITONEUM: Status: RESOLVED | Noted: 2023-01-20 | Resolved: 2024-04-16

## 2024-04-16 PROBLEM — S02.609A: Status: RESOLVED | Noted: 2023-08-13 | Resolved: 2024-04-16

## 2024-04-16 PROBLEM — D62 ACUTE POSTHEMORRHAGIC ANEMIA: Status: RESOLVED | Noted: 2023-02-21 | Resolved: 2024-04-16

## 2024-04-16 PROBLEM — R64 CACHEXIA (MULTI): Status: RESOLVED | Noted: 2023-01-14 | Resolved: 2024-04-16

## 2024-04-16 PROBLEM — F43.10 PTSD (POST-TRAUMATIC STRESS DISORDER): Status: RESOLVED | Noted: 2019-08-29 | Resolved: 2024-04-16

## 2024-04-16 PROBLEM — R10.9 ABDOMINAL PAIN: Status: RESOLVED | Noted: 2022-08-16 | Resolved: 2024-04-16

## 2024-04-16 PROBLEM — R11.0 NAUSEA: Status: RESOLVED | Noted: 2023-01-19 | Resolved: 2024-04-16

## 2024-04-16 PROBLEM — F10.21 ALCOHOL USE DISORDER, SEVERE, IN SUSTAINED REMISSION (MULTI): Status: ACTIVE | Noted: 2019-11-01

## 2024-04-16 PROBLEM — F14.20 SEVERE COCAINE USE DISORDER (MULTI): Status: ACTIVE | Noted: 2019-11-04

## 2024-04-16 PROBLEM — Z59.00 HOMELESSNESS UNSPECIFIED: Status: RESOLVED | Noted: 2023-01-19 | Resolved: 2024-04-16
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
## 2024-04-16 PROBLEM — S37.009A INJURY OF KIDNEY: Status: RESOLVED | Noted: 2023-01-19 | Resolved: 2024-04-16

## 2024-04-16 PROBLEM — S02.620D: Status: RESOLVED | Noted: 2023-08-22 | Resolved: 2024-04-16

## 2024-04-16 LAB — GLUCOSE BLD MANUAL STRIP-MCNC: 106 MG/DL (ref 74–99)

## 2024-04-16 PROCEDURE — G0378 HOSPITAL OBSERVATION PER HR: HCPCS

## 2024-04-16 PROCEDURE — 2500000001 HC RX 250 WO HCPCS SELF ADMINISTERED DRUGS (ALT 637 FOR MEDICARE OP): Mod: SE | Performed by: NURSE PRACTITIONER

## 2024-04-16 PROCEDURE — 99223 1ST HOSP IP/OBS HIGH 75: CPT | Performed by: NURSE PRACTITIONER

## 2024-04-16 PROCEDURE — 99231 SBSQ HOSP IP/OBS SF/LOW 25: CPT | Performed by: SURGERY

## 2024-04-16 PROCEDURE — 82947 ASSAY GLUCOSE BLOOD QUANT: CPT

## 2024-04-16 PROCEDURE — 97165 OT EVAL LOW COMPLEX 30 MIN: CPT | Mod: GO

## 2024-04-16 PROCEDURE — 2500000006 HC RX 250 W HCPCS SELF ADMINISTERED DRUGS (ALT 637 FOR ALL PAYERS): Mod: SE | Performed by: NURSE PRACTITIONER

## 2024-04-16 RX ORDER — RISPERIDONE 0.5 MG/1
2 TABLET ORAL 2 TIMES DAILY
Status: DISCONTINUED | OUTPATIENT
Start: 2024-04-16 | End: 2024-04-21 | Stop reason: HOSPADM

## 2024-04-16 RX ORDER — ASPIRIN 81 MG/1
81 TABLET ORAL DAILY
Status: CANCELLED | OUTPATIENT
Start: 2024-04-16

## 2024-04-16 RX ORDER — HYDROXYZINE HYDROCHLORIDE 25 MG/1
50 TABLET, FILM COATED ORAL 2 TIMES DAILY PRN
Status: DISCONTINUED | OUTPATIENT
Start: 2024-04-16 | End: 2024-04-21 | Stop reason: HOSPADM

## 2024-04-16 RX ORDER — TAMSULOSIN HYDROCHLORIDE 0.4 MG/1
0.4 CAPSULE ORAL DAILY
Status: DISCONTINUED | OUTPATIENT
Start: 2024-04-16 | End: 2024-04-21 | Stop reason: HOSPADM

## 2024-04-16 RX ORDER — ASPIRIN 81 MG/1
81 TABLET ORAL DAILY
Status: DISCONTINUED | OUTPATIENT
Start: 2024-04-16 | End: 2024-04-21 | Stop reason: HOSPADM

## 2024-04-16 RX ORDER — DOXEPIN HYDROCHLORIDE 50 MG/1
50 CAPSULE ORAL NIGHTLY
Status: CANCELLED | OUTPATIENT
Start: 2024-04-16

## 2024-04-16 RX ORDER — BENZTROPINE MESYLATE 1 MG/1
1 TABLET ORAL 2 TIMES DAILY
Status: DISCONTINUED | OUTPATIENT
Start: 2024-04-16 | End: 2024-04-21 | Stop reason: HOSPADM

## 2024-04-16 RX ORDER — ATORVASTATIN CALCIUM 40 MG/1
40 TABLET, FILM COATED ORAL NIGHTLY
Status: DISCONTINUED | OUTPATIENT
Start: 2024-04-16 | End: 2024-04-21 | Stop reason: HOSPADM

## 2024-04-16 RX ORDER — RISPERIDONE 0.5 MG/1
2 TABLET ORAL 2 TIMES DAILY
Status: CANCELLED | OUTPATIENT
Start: 2024-04-16

## 2024-04-16 RX ORDER — GABAPENTIN 300 MG/1
300 CAPSULE ORAL DAILY
Status: DISCONTINUED | OUTPATIENT
Start: 2024-04-16 | End: 2024-04-21 | Stop reason: HOSPADM

## 2024-04-16 RX ORDER — BENZTROPINE MESYLATE 1 MG/1
1 TABLET ORAL 2 TIMES DAILY
Status: CANCELLED | OUTPATIENT
Start: 2024-04-16

## 2024-04-16 RX ORDER — DOXEPIN HYDROCHLORIDE 50 MG/1
50 CAPSULE ORAL NIGHTLY
Status: DISCONTINUED | OUTPATIENT
Start: 2024-04-16 | End: 2024-04-21 | Stop reason: HOSPADM

## 2024-04-16 RX ORDER — TAMSULOSIN HYDROCHLORIDE 0.4 MG/1
0.4 CAPSULE ORAL DAILY
Status: CANCELLED | OUTPATIENT
Start: 2024-04-16

## 2024-04-16 RX ORDER — HYDROXYZINE HYDROCHLORIDE 50 MG/1
50 TABLET, FILM COATED ORAL EVERY 6 HOURS PRN
Status: CANCELLED | OUTPATIENT
Start: 2024-04-16

## 2024-04-16 RX ORDER — ATORVASTATIN CALCIUM 40 MG/1
40 TABLET, FILM COATED ORAL NIGHTLY
Status: CANCELLED | OUTPATIENT
Start: 2024-04-16

## 2024-04-16 RX ORDER — GABAPENTIN 300 MG/1
300 CAPSULE ORAL DAILY
Status: CANCELLED | OUTPATIENT
Start: 2024-04-16

## 2024-04-16 RX ORDER — ASPIRIN 81 MG/1
81 TABLET ORAL DAILY
COMMUNITY

## 2024-04-16 RX ADMIN — BENZTROPINE MESYLATE 1 MG: 1 TABLET ORAL at 20:53

## 2024-04-16 RX ADMIN — DOXEPIN HYDROCHLORIDE 50 MG: 50 CAPSULE ORAL at 21:06

## 2024-04-16 RX ADMIN — GABAPENTIN 300 MG: 300 CAPSULE ORAL at 16:40

## 2024-04-16 RX ADMIN — RISPERIDONE 2 MG: 0.5 TABLET ORAL at 20:53

## 2024-04-16 RX ADMIN — ASPIRIN 81 MG: 81 TABLET, COATED ORAL at 16:40

## 2024-04-16 RX ADMIN — ACETAMINOPHEN 650 MG: 325 TABLET ORAL at 12:42

## 2024-04-16 RX ADMIN — TAMSULOSIN HYDROCHLORIDE 0.4 MG: 0.4 CAPSULE ORAL at 16:40

## 2024-04-16 RX ADMIN — ATORVASTATIN CALCIUM 40 MG: 40 TABLET, FILM COATED ORAL at 20:53

## 2024-04-16 RX ADMIN — ACETAMINOPHEN 650 MG: 325 TABLET ORAL at 07:50

## 2024-04-16 ASSESSMENT — ENCOUNTER SYMPTOMS
RESPIRATORY NEGATIVE: 1
MUSCULOSKELETAL NEGATIVE: 1
CARDIOVASCULAR NEGATIVE: 1
CONSTITUTIONAL NEGATIVE: 1
NEUROLOGICAL NEGATIVE: 1
WHEEZING: 0
GASTROINTESTINAL NEGATIVE: 1
EYES NEGATIVE: 1

## 2024-04-16 ASSESSMENT — PAIN SCALES - GENERAL
PAINLEVEL_OUTOF10: 10 - WORST POSSIBLE PAIN
PAINLEVEL_OUTOF10: 10 - WORST POSSIBLE PAIN
PAINLEVEL_OUTOF10: 0 - NO PAIN
PAINLEVEL_OUTOF10: 0 - NO PAIN

## 2024-04-16 ASSESSMENT — ACTIVITIES OF DAILY LIVING (ADL)
BATHING_ASSISTANCE: MINIMAL
ADL_ASSISTANCE: INDEPENDENT

## 2024-04-16 ASSESSMENT — PAIN - FUNCTIONAL ASSESSMENT
PAIN_FUNCTIONAL_ASSESSMENT: 0-10
PAIN_FUNCTIONAL_ASSESSMENT: 0-10

## 2024-04-16 ASSESSMENT — COGNITIVE AND FUNCTIONAL STATUS - GENERAL
HELP NEEDED FOR BATHING: A LITTLE
PERSONAL GROOMING: A LITTLE
DRESSING REGULAR UPPER BODY CLOTHING: A LITTLE
DRESSING REGULAR LOWER BODY CLOTHING: A LOT
TOILETING: A LITTLE
DAILY ACTIVITIY SCORE: 18

## 2024-04-16 NOTE — PROGRESS NOTES
ADMISSION Note  [facility] CLINICAL DECISION  Patient: Bjorn Cedeno  MRN: [id number]  : 1965  Date of Evaluation: 2024  ED Provider: JUSTINA Fortune DNP      Limitations to history: none   Independent Historian: yes  External Records Reviewed: N/A      Subjective:    Bjorn Cedeno is a 58 y.o. male has undergone comprehensive diagnostic evaluation and therapeutic management in accordance with the CDU guidelines for abdominal wound dehiscence.  Based on the patient's clinical response and diagnostic information during this period of observation, it has been determined that the patient will be admitted.      The acute evaluation included:  Orders Placed This Encounter   Procedures    CT abdomen pelvis w IV contrast    CBC and Auto Differential    Comprehensive metabolic panel    Sedimentation rate, automated    C-reactive protein    Urinalysis with Reflex Microscopic    Drug Screen, Urine    Adult diet Regular    Activity (specify) No Restrictions    Vital Signs    Notify provider    Place sequential compression device    Dressing - wet to dry change    Inpatient consult to Acute Care Surgery    Inpatient consult to Social Work and TCC    OT eval and treat    PT eval and treat    POCT GLUCOSE    Electrocardiogram, 12-lead PRN ACS symptoms    Inpatient Consult to Wound and Ostomy Nurse    Send to CDU    Initiate observation status    ED to floor bed request         Placed in observation at: 24        Past History   No past medical history on file.  Past Surgical History:   Procedure Laterality Date    CT ANGIO NECK  2023    CT ANGIO NECK 2023     Social History     Socioeconomic History    Marital status: Single     Spouse name: Not on file    Number of children: Not on file    Years of education: Not on file    Highest education level: Not on file   Occupational History    Not on file   Tobacco Use    Smoking status: Never    Smokeless tobacco: Never   Substance  and Sexual Activity    Alcohol use: Not Currently    Drug use: Not on file    Sexual activity: Not Currently   Other Topics Concern    Not on file   Social History Narrative    Not on file     Social Determinants of Health     Financial Resource Strain: Patient Declined (4/16/2024)    Overall Financial Resource Strain (CARDIA)     Difficulty of Paying Living Expenses: Patient declined   Recent Concern: Financial Resource Strain - High Risk (2/3/2024)    Overall Financial Resource Strain (CARDIA)     Difficulty of Paying Living Expenses: Very hard   Food Insecurity: Patient Declined (2/3/2024)    Hunger Vital Sign     Worried About Running Out of Food in the Last Year: Patient declined     Ran Out of Food in the Last Year: Patient declined   Transportation Needs: Unmet Transportation Needs (2/3/2024)    PRAPARE - Transportation     Lack of Transportation (Medical): Yes     Lack of Transportation (Non-Medical): Yes   Physical Activity: Patient Declined (2/3/2024)    Exercise Vital Sign     Days of Exercise per Week: Patient declined     Minutes of Exercise per Session: Patient declined   Stress: Patient Declined (2/3/2024)    [employer] [place] [employer] - Occupational Stress Questionnaire     Feeling of Stress : Patient declined   Social Connections: Not on File (8/27/2019)    Received from [employer]     Social Connections and Isolation: 0   Intimate Partner Violence: Not on file   Housing Stability: High Risk (4/16/2024)    Housing Stability Vital Sign     Unable to Pay for Housing in the Last Year: Yes     Number of Places Lived in the Last Year: 3     Unstable Housing in the Last Year: Yes         Medications/Allergies     Current Discharge Medication List        CONTINUE these medications which have NOT CHANGED    Details   acetaminophen (Tylenol) 325 mg tablet Take 2 tablets (650 mg) by mouth every 6 hours if needed for mild pain (1 - 3).  Qty: 30 tablet, Refills: 0    Associated  "Diagnoses: Abnormal computed tomography angiography (CTA) of neck      atorvastatin (Lipitor) 40 mg tablet Take 1 tablet (40 mg) by mouth once daily at bedtime.  Qty: 30 tablet, Refills: 0    Associated Diagnoses: Abnormal computed tomography angiography (CTA) of neck      benztropine (Cogentin) 1 mg tablet Take 1 tablet (1 mg) by mouth 2 times a day.      doxepin (SINEquan) 50 mg capsule Take 1 capsule (50 mg) by mouth once daily at bedtime.      gabapentin (Neurontin) 300 mg capsule Take 1 capsule (300 mg) by mouth once daily.      hydrOXYzine HCL (Atarax) 50 mg tablet Take 1 tablet (50 mg) by mouth 2 times a day as needed for anxiety.      non-adherent bandage (Curity Abdominal Pad) 8 X 10 \" bandage Apply 1 each topically once daily.  Qty: 30 each, Refills: 0    Associated Diagnoses: Abnormal computed tomography angiography (CTA) of neck      risperiDONE (RisperDAL) 2 mg tablet Take 1 tablet (2 mg) by mouth 2 times a day.      silver-foam bandage (Aquacel AG Foam) 1.2 %- 8\" X 8\" bandage Apply 1 each topically once daily.  Qty: 30 each, Refills: 0    Associated Diagnoses: Abnormal computed tomography angiography (CTA) of neck      tamsulosin (Flomax) 0.4 mg 24 hr capsule Take 1 capsule (0.4 mg) by mouth once daily.           Allergies   Allergen Reactions    Ibuprofen Unknown         Review of Systems  All systems reviewed and otherwise negative, except as stated above in HPI.    Diagnostics reviewed by Johanny Nelson, APRN-CNP, DNP     Labs:  Results for orders placed or performed during the hospital encounter of 04/14/24   CBC and Auto Differential   Result Value Ref Range    WBC 5.4 4.4 - 11.3 x10*3/uL    nRBC 0.0 0.0 - 0.0 /100 WBCs    RBC 5.21 4.50 - 5.90 x10*6/uL    Hemoglobin 15.6 13.5 - 17.5 g/dL    Hematocrit 44.9 41.0 - 52.0 %    MCV 86 80 - 100 fL    MCH 29.9 26.0 - 34.0 pg    MCHC 34.7 32.0 - 36.0 g/dL    RDW 11.9 11.5 - 14.5 %    Platelets 221 150 - 450 x10*3/uL    Neutrophils % 46.2 40.0 - 80.0 % "    Immature Granulocytes %, Automated 0.2 0.0 - 0.9 %    Lymphocytes % 32.1 13.0 - 44.0 %    Monocytes % 16.6 2.0 - 10.0 %    Eosinophils % 4.2 0.0 - 6.0 %    Basophils % 0.7 0.0 - 2.0 %    Neutrophils Absolute 2.50 1.20 - 7.70 x10*3/uL    Immature Granulocytes Absolute, Automated 0.01 0.00 - 0.70 x10*3/uL    Lymphocytes Absolute 1.74 1.20 - 4.80 x10*3/uL    Monocytes Absolute 0.90 0.10 - 1.00 x10*3/uL    Eosinophils Absolute 0.23 0.00 - 0.70 x10*3/uL    Basophils Absolute 0.04 0.00 - 0.10 x10*3/uL   Comprehensive metabolic panel   Result Value Ref Range    Glucose 56 (L) 74 - 99 mg/dL    Sodium 144 136 - 145 mmol/L    Potassium 3.8 3.5 - 5.3 mmol/L    Chloride 104 98 - 107 mmol/L    Bicarbonate 30 21 - 32 mmol/L    Anion Gap 14 10 - 20 mmol/L    Urea Nitrogen 13 6 - 23 mg/dL    Creatinine 1.01 0.50 - 1.30 mg/dL    eGFR 85 >60 mL/min/1.73m*2    Calcium 9.8 8.6 - 10.6 mg/dL    Albumin 4.3 3.4 - 5.0 g/dL    Alkaline Phosphatase 63 33 - 136 U/L    Total Protein 8.6 (H) 6.4 - 8.2 g/dL    AST 19 9 - 39 U/L    Bilirubin, Total 0.5 0.0 - 1.2 mg/dL    ALT 16 10 - 52 U/L   Sedimentation rate, automated   Result Value Ref Range    Sedimentation Rate 31 (H) 0 - 20 mm/h   C-reactive protein   Result Value Ref Range    C-Reactive Protein <0.10 <1.00 mg/dL   Urinalysis with Reflex Microscopic   Result Value Ref Range    Color, Urine Light-Yellow Light-Yellow, Yellow, Dark-Yellow    Appearance, Urine Clear Clear    Specific Gravity, Urine 1.014 1.005 - 1.035    pH, Urine 7.0 5.0, 5.5, 6.0, 6.5, 7.0, 7.5, 8.0    Protein, Urine NEGATIVE NEGATIVE, 10 (TRACE), 20 (TRACE) mg/dL    Glucose, Urine Normal Normal mg/dL    Blood, Urine NEGATIVE NEGATIVE    Ketones, Urine NEGATIVE NEGATIVE mg/dL    Bilirubin, Urine NEGATIVE NEGATIVE    Urobilinogen, Urine Normal Normal mg/dL    Nitrite, Urine NEGATIVE NEGATIVE    Leukocyte Esterase, Urine NEGATIVE NEGATIVE   Drug Screen, Urine   Result Value Ref Range    Amphetamine Screen, Urine Presumptive  "Negative Presumptive Negative    Barbiturate Screen, Urine Presumptive Negative Presumptive Negative    Benzodiazepines Screen, Urine Presumptive Negative Presumptive Negative    Cannabinoid Screen, Urine Presumptive Negative Presumptive Negative    Cocaine Metabolite Screen, Urine Presumptive Negative Presumptive Negative    Fentanyl Screen, Urine Presumptive Negative Presumptive Negative    Opiate Screen, Urine Presumptive Negative Presumptive Negative    Oxycodone Screen, Urine Presumptive Negative Presumptive Negative    PCP Screen, Urine Presumptive Negative Presumptive Negative    Methadone Screen, Urine Presumptive Negative Presumptive Negative   POCT GLUCOSE   Result Value Ref Range    POCT Glucose 106 (H) 74 - 99 mg/dL     Radiographs:  CT abdomen pelvis w IV contrast   Final Result   1.  Evidence prior wound dehiscence with thickening along the anterior   abdominal wall fascia but does not contain any drainable fluid   collections or locules of air.  No drainable abscess within the   intra-abdominal soft tissues.   Additional findings as above..   Signed by Dandre Rondon,               Physical Exam     Visit Vitals  /77   Pulse 65   Temp 36.9 °C (98.4 °F) (Tympanic)   Resp 18   Ht 1.88 m (6' 2\")   Wt 82.6 kg (182 lb)   SpO2 100%   BMI 23.37 kg/m²   Smoking Status Never   BSA 2.08 m²       GENERAL:  The patient appears nourished and normally developed. Vital signs as documented.   HEENT:  Head normocephalic, atraumatic, EOMs intact, PERRLA, Mucous membranes moist. Nares patent without copious rhinorrhea.  No lymphadenopathy.  PULMONARY:  Lungs are clear to auscultation, without any respiratory distress. Able to speak full sentences, no accessory muscle use  CARDIAC:   Normal rate. No murmurs, rubs or gallops  ABDOMEN:    Soft, mildly tender to palpation around midline wound. No guarding or distention. Surgical abdominal wound around the midline area clean with scar tissue on the upper abdomen. " "Amount of serosanguineous drainage was noted. No purulence or erythema. No bleeding. Bowel sounds normal. No pain McBurney's point. No Vincent sign.   MUSCULOSKELETAL:   Able to ambulate, Non edematous, with no obvious deformities. Pulses intact distal  SKIN:   Good color, with no significant rashes.  No pallor.  NEURO:  No obvious neurological deficits, normal sensation and strength bilaterally.  Able to follow commands, NIH 0, CN 2-12 intact.  Psych: Appropriate mood and affect    Consultants  1) ACS  2) wound care      Impression and Plan    In summary, Bjorn Cedeno has been cared for according to the standard [facility] for Emergency Medicine Clinical Decision Unit observation protocol for Wound dehiscence. This extended period of observation was specifically required to determine the need for hospitalization. Prior to discharge from observation, the final physical exam is documented above.     Significant events during the course of observation based on the goals of the clinical problem list include:   1) PT/OT evaluation recommending SNF   2) ACS consult (completed)  3) wound care to see the patient in the CDU  4)  is working on SNF placement. He was denied by one facility and unclear at this time whether he will be placed today or tomorrow and the patient has been in the CDU for two days therefore the decision was made to admit the patient to medicine.     Based on the patient's condition and test results, the patient will be admitted.     Total length of observation was 49 hours. Dr. Carias  is the CDU disposition attending.      Discharge Diagnosis  Wound dehiscence    Issues Requiring Follow-Up  Chronic wound     Discharge Meds     Your medication list        CONTINUE taking these medications        Instructions Last Dose Given Next Dose Due   Aquacel AG Foam 1.2 %- 8\" X 8\" bandage  Generic drug: silver-foam bandage      Apply 1 each topically once daily.       Curity Abdominal Pad 8 X 10 \" " bandage  Generic drug: non-adherent bandage      Apply 1 each topically once daily.              ASK your doctor about these medications        Instructions Last Dose Given Next Dose Due   acetaminophen 325 mg tablet  Commonly known as: Tylenol      Take 2 tablets (650 mg) by mouth every 6 hours if needed for mild pain (1 - 3).       atorvastatin 40 mg tablet  Commonly known as: Lipitor      Take 1 tablet (40 mg) by mouth once daily at bedtime.       benztropine 1 mg tablet  Commonly known as: Cogentin           doxepin 50 mg capsule  Commonly known as: SINEquan           gabapentin 300 mg capsule  Commonly known as: Neurontin           hydrOXYzine HCL 50 mg tablet  Commonly known as: Atarax           risperiDONE 2 mg tablet  Commonly known as: RisperDAL           tamsulosin 0.4 mg 24 hr capsule  Commonly known as: Flomax                    Test Results Pending At Discharge  Pending Labs       No current pending labs.          CDU COURSE:   The patient was admitted to the CDU for PT/OT and ultimately SNF placement but he has not been placed after two days in the CDU therefore he was admitted to medicine.  He was also seen by wound care team. Please see their Recs    Outpatient Follow-Up  Will be determined at discharge       Johanny Nelson, APRN-CNP, DNP

## 2024-04-16 NOTE — PROGRESS NOTES
[facility]  ACUTE CARE SURGERY - PROGRESS NOTE    Patient Name: Bjorn Cedeno  MRN: [id number]  Admit Date: [id number]  : 1965  AGE: 58 y.o.   GENDER: male  ==============================================================================  TODAY'S ASSESSMENT AND PLAN OF CARE:  58 y.o. male with history of COPD, lung nodules, bronchiectasis, prostate cancer who & Ex lap, BROOKE with small bowel resection with anastomosis after leaving AMA for small bowel obstruction in 2023. ACS consulted for wound dehiscence evaluation. Superficial wound that will heal by secondary intention with appropriate wound care. No surgical intervention at this time. However given patient living situation interfering with adequate wound care would recommend SW assistance with living situation prior to discharge from ED.      Recommendations:  - Consult wound care for long lasting dressing recs  - Will arrange outpatient wound clinic follow-up  - Daily dressing change with Aquacel or Wet to dry with NS  - Ensure appropriate nutrition  - Continue to follow with SW regarding dispo & living situation     Discussed with attending physician Dr. Lopez.     Kiara Peralta MD  Family Medicine  PGY-1  [facility] [id number].     ==============================================================================  CHIEF COMPLAINT / EVENTS LAST 24HRS / HPI:  NAEO. VSS stable. Pending IP rehab.    MEDICAL HISTORY / ROS:   Admission history and ROS reviewed. Pertinent changes as follows:  None.    PHYSICAL EXAM:  Heart Rate:  [53-73]   Temperature:  [36.4 °C (97.5 °F)-36.9 °C (98.4 °F)]   Respirations:  [16-18]   BP: ()/(60-90)   Pulse Ox:  [95 %-100 %]     Physical Exam  General: well-appearing, NAD  HEENT: NC/AT  Cardiac: regular rate, perfusing well  Lungs: normal work of breathing  Abdomen: soft, mildly tender to palpation around midline wound, non-distended, no guarding, no rebound.  Midline surgical abdominal wound  c/d/I. No surrounding erythema or purulence.    Neuro: grossly intact  MSK: No peripheral edema, cyanosis, or pallor  Psych: no depression, anxiety      IMAGING SUMMARY:  (summary of new imaging findings, not a copy of dictation)  None    LABS:  Results from last 7 days   Lab Units 04/14/24  1750   WBC AUTO x10*3/uL 5.4   HEMOGLOBIN g/dL 15.6   HEMATOCRIT % 44.9   PLATELETS AUTO x10*3/uL 221   NEUTROS PCT AUTO % 46.2   LYMPHS PCT AUTO % 32.1   MONOS PCT AUTO % 16.6   EOS PCT AUTO % 4.2         Results from last 7 days   Lab Units 04/14/24  1750   SODIUM mmol/L 144   POTASSIUM mmol/L 3.8   CHLORIDE mmol/L 104   CO2 mmol/L 30   BUN mg/dL 13   CREATININE mg/dL 1.01   CALCIUM mg/dL 9.8   PROTEIN TOTAL g/dL 8.6*   BILIRUBIN TOTAL mg/dL 0.5   ALK PHOS U/L 63   ALT U/L 16   AST U/L 19   GLUCOSE mg/dL 56*     Results from last 7 days   Lab Units 04/14/24  1750   BILIRUBIN TOTAL mg/dL 0.5           I have reviewed all medications, laboratory results, and imaging pertinent for today's encounter.

## 2024-04-16 NOTE — PROGRESS NOTES
Occupational Therapy    Evaluation    Patient Name: Bjorn Cedeno  MRN: [id number]  Today's Date: 4/16/2024  Time Calculation  Start Time: 0907  Stop Time: 0917  Time Calculation (min): 10 min      Assessment:  OT Assessment: Pt will benefit from continued skilled OT to increase independence in ADLs, functional mobility, safety awareness, and activity tolerance.  Prognosis: Good  Barriers to Discharge: Inaccessible home environment  Evaluation/Treatment Tolerance: Patient limited by pain  Medical Staff Made Aware: Yes  End of Session Communication: Bedside nurse  End of Session Patient Position: Bed, 3 rail up, Alarm off, not on at start of session  OT Assessment Results: Decreased ADL status, Decreased safe judgment during ADL, Decreased endurance, Decreased functional mobility, Decreased gross motor control  Prognosis: Good  Barriers to Discharge: Inaccessible home environment  Evaluation/Treatment Tolerance: Patient limited by pain  Medical Staff Made Aware: Yes  Strengths: Ability to acquire knowledge  Barriers to Participation: Living arrangement secure, Comorbidities, Support and attitude of living partners  Plan:  Treatment Interventions: ADL retraining, Functional transfer training, Endurance training, Patient/family training, Equipment evaluation/education, Compensatory technique education  OT Frequency: 3 times per week  OT Discharge Recommendations: Moderate intensity level of continued care  Equipment Recommended upon Discharge: Wheeled walker  OT Recommended Transfer Status: Assist of 1  OT - OK to Discharge: Yes (upon medical clearance)  Treatment Interventions: ADL retraining, Functional transfer training, Endurance training, Patient/family training, Equipment evaluation/education, Compensatory technique education    Subjective   Current Problem:  1. Chronic wound infection of abdomen, initial encounter        2. Housing instability        3. Wound dehiscence  Follow up in Trauma Surgery     "    General:  General  Reason for Referral: Increasing wound drainage from abdominal wound  Past Medical History Relevant to Rehab: COPD, lung nodules, bronchiectasis, prostate cancer, s/p ex lap, BROOKE, and small bowel resection with anastomosis in Jan 2023  Family/Caregiver Present: No  Prior to Session Communication: Bedside nurse  Patient Position Received: Bed, 3 rail up, Alarm off, not on at start of session  Preferred Learning Style: auditory, verbal  General Comment: Pt supine in bed upon arrival, agreeable to OT  Precautions:  Hearing/Visual Limitations: WFL  Medical Precautions: Fall precautions  Post-Surgical Precautions: Abdominal surgery precautions  Precautions Comment: Educated pt. on abdominal precautions and log roll technique to promote wound healing    Pain:  Pain Assessment  Pain Assessment: 0-10  Pain Score: 10 - Worst possible pain  Pain Location: Head    Objective   Cognition:  Overall Cognitive Status: Within Functional Limits  Orientation Level: Oriented X4  Insight: Mild  Impulsive: Mildly     Home Living:  Type of Home: Group home (pt referring to place as \"senior living\")  Lives With: Other (Comment) (reports \"a lot\" of people)  Home Adaptive Equipment:  (reports had cane but recently stolen)  Home Layout: Stairs to alternate level with rails, Bed/bath upstairs  Home Access: Stairs to enter with rails  Entrance Stairs-Number of Steps: 12  Bathroom Shower/Tub: Walk-in shower  Home Living Comments: Reports can not do laundry becuause he has no money, reports meals provided by facility  Prior Function:  Level of [place]: Independent with ADLs and functional transfers, Independent with homemaking with ambulation  ADL Assistance: Independent  Homemaking Assistance: Independent  Ambulatory Assistance: Independent  Vocational: On disability  Leisure: TV, laying around  Prior Function Comments: (+) 2 recent falls  IADL History:  Homemaking Responsibilities: No  ADL:  Eating Assistance: Independent " (anticipated)  Grooming Assistance: Stand by (anticipated)  Bathing Assistance: Minimal (anticipated seated)  UE Dressing Assistance: Minimal (adjusted gown seated EOB min A)  LE Dressing Assistance: Maximal (donned B socks supine in bed max A)  Toileting Assistance with Device: Minimal (anticipated)  Activity Tolerance:  Endurance: Tolerates 10 - 20 min exercise with multiple rests  Bed Mobility/Transfers: Bed Mobility  Bed Mobility: Yes  Bed Mobility 1  Bed Mobility 1: Supine to sitting  Level of Assistance 1: Minimum assistance, Minimal verbal cues  Bed Mobility Comments 1: VCs for log roll, extended time to complete 2/2 pain  Bed Mobility 2  Bed Mobility  2: Sitting to supine  Level of Assistance 2: Contact guard, Minimal verbal cues  Bed Mobility Comments 2: Vcs for log roll    Transfers  Transfer: Yes  Transfer 1  Transfer From 1: Sit to, Stand to  Transfer to 1: Stand, Sit  Technique 1: Sit to stand, Stand to sit  Transfer Level of Assistance 1: Minimum assistance, Minimal verbal cues    Functional Mobility:  Functional Mobility  Functional Mobility Performed: Yes  Functional Mobility 1  Surface 1: Level tile  Device 1: No device  Assistance 1: Contact guard  Comments 1: fxnl mob min household distance CGA, no AD, pt with 1 LOB min A to recover  Sitting Balance:  Static Sitting Balance  Static Sitting-Balance Support: Feet supported, Bilateral upper extremity supported  Static Sitting-Level of Assistance: Close supervision  Dynamic Sitting Balance  Dynamic Sitting-Balance Support: Feet supported, Bilateral upper extremity supported  Dynamic Sitting-Comments: CGA  Standing Balance:  Static Standing Balance  Static Standing-Level of Assistance: Contact guard  Dynamic Standing Balance  Dynamic Standing-Comments: CGA-min A   Modalities:  Modalities Used: No  Vision:Vision - Basic Assessment  Current Vision: Wears glasses only for reading  Sensation:  Light Touch: No apparent deficits  Strength:  Strength  Comments: BUE at least 3/5 grossly, not formally tested 2/2 abdominal prec  Perception:  Inattention/Neglect: Appears intact  Initiation: Appears intact  Motor Planning: Appears intact  Perseveration: Not present  Coordination:  Movements are Fluid and Coordinated: Yes   Hand Function:  Gross Grasp: Functional  Coordination: Functional  Extremities: RUE   RUE :  (BUE at least 3/5 grossly, not formally tested 2/2 abdominal prec) and LUE   LUE:  (BUE at least 3/5 grossly, not formally tested 2/2 abdominal prec)    Outcome Measures:[facility] Daily Activity  Putting on and taking off regular lower body clothing: A lot  Bathing (including washing, rinsing, drying): A little  Putting on and taking off regular upper body clothing: A little  Toileting, which includes using toilet, bedpan or urinal: A little  Taking care of personal grooming such as brushing teeth: A little  Eating Meals: None  Daily Activity - Total Score: 18     and OT Adult Other Outcome Measures  4AT: negative    Education Documentation  Body Mechanics, taught by Serjio Kay OT at 4/16/2024 11:46 AM.  Learner: Patient  Readiness: Acceptance  Method: Explanation  Response: Verbalizes Understanding    Precautions, taught by Serjio Kay OT at 4/16/2024 11:46 AM.  Learner: Patient  Readiness: Acceptance  Method: Explanation  Response: Verbalizes Understanding    ADL Training, taught by Serjio Kay OT at 4/16/2024 11:46 AM.  Learner: Patient  Readiness: Acceptance  Method: Explanation  Response: Verbalizes Understanding    Education Comments  No comments found.      Goals:  Encounter Problems       Encounter Problems (Active)       ADLs       Patient with complete lower body dressing with set-up level of assistance donning and doffing all LE clothes  with PRN adaptive equipment while edge of bed  and standing (Progressing)       Start:  04/16/24    Expected End:  05/07/24            Patient will complete toileting including hygiene clothing  management/hygiene with modified independent level of assistance and grab bars. (Progressing)       Start:  04/16/24    Expected End:  05/07/24               BALANCE       Pt will maintain dynamic standing balance during ADL task with modified independent level of assistance in order to demonstrate decreased risk of falling and improved postural control. (Progressing)       Start:  04/16/24    Expected End:  05/07/24               MOBILITY       Patient will perform Functional mobility max Household distances/Community Distances with modified independent level of assistance and least restrictive device in order to improve safety and functional mobility. (Progressing)       Start:  04/16/24    Expected End:  05/07/24               TRANSFERS       Patient will complete sit to stand transfer with modified independent level of assistance and least restrictive device in order to improve safety and prepare for out of bed mobility. (Progressing)       Start:  04/16/24    Expected End:  05/07/24               KEANU Bradshaw/L

## 2024-04-16 NOTE — CONSULTS
Wound Care Consult     Visit Date: 4/16/2024      Patient Name: Bjorn Cedeno         MRN: [id number]           YOB: 1965     Reason for Consult: Midline Abdomen wound         Wound History: - Consult wound care for long lasting dressing recs. Last time seen by wound care on 2/5.     Wound Assessment:  Wound 02/03/24 Incision Abdomen (Active)   Wound Image   04/16/24 1400   Site Assessment Red;Yellow;Fibrinous 04/16/24 1400   Kaci-Wound Assessment Dry 04/16/24 1400   Shape linear 04/16/24 1400   Wound Length (cm) 11 cm 04/16/24 1400   Wound Width (cm) 6.5 cm 04/16/24 1400   Wound Surface Area (cm^2) 71.5 cm^2 04/16/24 1400   Wound Depth (cm) 0.5 cm 04/16/24 1400   Wound Volume (cm^3) 35.75 cm^3 04/16/24 1400   Wound Healing % -1390 04/16/24 1400   State of Healing Healing ridge 04/16/24 1400   Margins Well-defined edges 04/16/24 1400   Closure Open to air 04/16/24 1400   Sutures/Staple Line Non approximated 04/16/24 1400   Drainage Description Perez;Yellow 04/16/24 1400   Drainage Amount Moderate 04/16/24 1400   Dressing Hydrofiber;Silicone border dressing 04/16/24 1400   Dressing Changed New 04/16/24 1400   Dressing Status Dry;Clean 04/16/24 1400       Wound Team Summary Assessment: The wound care team came to bedside to assess the patient's midline abdomen wound.  The patient's dressing was removed and a moderate amount of yellow fibrinous exudate was noted on the dressing.  The wound was cleansed with vashe wound cleanser and 3M cavilon was applied to the periwound skin.  After irrigating the wound, mesh was noted at the base of the wound.  A strip of hydrofera blue ready foam was applied to the wound bed and covered with a 6x8 mepilex border dressing.      Wound Team Plan:   Recommendations: Change Every 3 days. Next change is 4/19 noted on the dressing.  Cleanse the wound with normal saline   Apply a strip of hydrofera Blue ready foam to the wound bed.  Cover with a 6x8 mepilex border  dressing.    If you agree with the recommendation, please input as an order to followed while the patient is inpatient     Chito Corona RN-BC, CWON  4/16/2024  2:08 PM

## 2024-04-16 NOTE — PROGRESS NOTES
Bjorn Cedeno is a 58 y.o. male on day 0 of admission presenting with Wound dehiscence.      ED SOCIAL WORK NOTE:     Patient was not accepted to [facility]. SW placed referral to local facilities patient has not been to, as he has been to a several SNF's locally.  SW will update - if patient cannot be accepted today, may be admitted.       Addendum, 515p:  Patient admitted to medicine after additional facilities declined patient. Patient was visited at bedside by a liaison from [facility]. Pending acceptance. [facility] team awaiting update via [facility] to accept patient.  Patient to continue to update [facility] facility of his location.       Anticipated Plan: Follow with [facility] in AM, or send blanket referral   ADOD:  4/17/2024   JONATHON BARR LCSW

## 2024-04-16 NOTE — HOSPITAL COURSE
Mr Cedeno is a 58y male with PMHx: COPD, lung nodules, prostate cancer, ETOH and drug abuse who presented with worsening abodminal wound.  In October 2023 underwent an exp lap for SBO and a chronic non healing wound.  He was admitted to the CDU for eval.  Patient states he has been kicked out of several SNFs because they were being mean to him and he would start yelling. He also left AMA after the surgery in October.  Patient came to the ED and stated he was unhappy with his living situation and that his doctor doesn't think his living situation is good for his open abdominal wound.  Patient was admitted to the CDU but will need snf placement and to be admitted for wound care.

## 2024-04-16 NOTE — H&P
HPI     Mr Cedeno is a 58y male with PMHx: COPD, lung nodules, prostate cancer, ETOH and drug abuse who presented with worsening abodminal wound.  In October 2023 underwent an exp lap for SBO and a chronic non healing wound.  He was admitted to the CDU for eval.  Patient states he has been kicked out of several SNFs because they were being mean to him and he would start yelling. He also left AMA after the surgery in October.  Patient came to the ED and stated he was unhappy with his living situation and that his doctor doesn't think his living situation is good for his open abdominal wound.  Patient was admitted to the CDU but will need snf placement and to be admitted for wound care.     ROS/EXAM     Review of Systems   Constitutional: Negative.    HENT: Negative.     Eyes: Negative.    Respiratory: Negative.  Negative for wheezing.    Cardiovascular: Negative.    Gastrointestinal: Negative.    Genitourinary: Negative.    Musculoskeletal: Negative.    Skin: Negative.    Neurological: Negative.    All other systems reviewed and are negative.    Physical Exam  Vitals reviewed.   HENT:      Head: Normocephalic and atraumatic.      Nose: Nose normal.      Mouth/Throat:      Mouth: Mucous membranes are moist.      Pharynx: Oropharynx is clear.   Eyes:      Conjunctiva/sclera: Conjunctivae normal.   Cardiovascular:      Rate and Rhythm: Normal rate and regular rhythm.      Pulses: Normal pulses.      Heart sounds: Normal heart sounds.   Pulmonary:      Effort: Pulmonary effort is normal.      Breath sounds: Normal breath sounds.   Abdominal:      General: Abdomen is flat. Bowel sounds are normal.      Comments: Abdominal wound   Musculoskeletal:         General: Normal range of motion.      Cervical back: Normal range of motion and neck supple.   Skin:     General: Skin is warm and dry.      Capillary Refill: Capillary refill takes less than 2 seconds.   Neurological:      Mental Status: He is alert and oriented to  "person, place, and time.   Psychiatric:         Mood and Affect: Mood normal.         Vitals/LABS/RESULTS     Last Recorded Vitals  Blood pressure 103/66, pulse 58, temperature 36.7 °C (98.1 °F), temperature source Temporal, resp. rate 16, height 1.88 m (6' 2\"), weight 82.6 kg (182 lb), SpO2 98%.  Intake/Output last 3 Shifts:  No intake/output data recorded.    Relevant Results  Lab Results   Component Value Date    WBC 5.4 04/14/2024    HGB 15.6 04/14/2024    HCT 44.9 04/14/2024    MCV 86 04/14/2024     04/14/2024      Lab Results   Component Value Date    GLUCOSE 56 (L) 04/14/2024    CALCIUM 9.8 04/14/2024     04/14/2024    K 3.8 04/14/2024    CO2 30 04/14/2024     04/14/2024    BUN 13 04/14/2024    CREATININE 1.01 04/14/2024     CT abdomen pelvis w IV contrast    Result Date: 4/15/2024  STUDY: CT Abdomen and Pelvis with IV Contrast; 4/14/2024  11:48 PM. INDICATION: Prior wound dehiscence with recurrent infection. COMPARISON: CT CAP 2/15/2023.  XR hip 7/26/2023. ACCESSION NUMBER(S): [id number] ORDERING CLINICIAN: MYRA JESSICA TECHNIQUE: CT of the abdomen and pelvis was performed.  Contiguous axial images were obtained at 3 mm slice thickness through the abdomen and pelvis. Coronal and sagittal reconstructions at 3 mm slice thickness were performed.  Omnipaque 350 80 mL was administered intravenously.  FINDINGS: LOWER CHEST: No cardiomegaly.  No pericardial effusion.  Lung bases reveals atelectasis type changes left lower lobe.  Right lower lobe atelectasis has resolved.  ABDOMEN:  LIVER: No hepatomegaly.  Smooth surface contour.  Normal attenuation.  BILE DUCTS: No intrahepatic or extrahepatic biliary ductal dilatation.  GALLBLADDER: The gallbladder is unremarkable.. STOMACH: Stomach is fluid-filled and slightly distended. PANCREAS: No masses or ductal dilatation.  SPLEEN: No splenomegaly or focal splenic lesion.  ADRENAL GLANDS: No thickening or nodules.  KIDNEYS AND URETERS: Kidneys are " normal in size and location.  No renal or ureteral calculi.  PELVIS:  BLADDER: No abnormalities identified.  REPRODUCTIVE ORGANS: No abnormalities identified.  BOWEL: No abnormalities identified.  VESSELS: No abnormalities identified.  Abdominal aorta is normal in caliber.  PERITONEUM/RETROPERITONEUM/LYMPH NODES: No free fluid.  No pneumoperitoneum.  There is mild periPortal adenopathy demonstrated.  This is similar as compared to previous exams. ABDOMINAL WALL: There is evidence prior wound effusions with thickening of the midline abdominal wall fascia.  There does not appear to be any air within the soft tissues or drainable fluid collections. SOFT TISSUES: No abnormalities identified.  BONES: No acute fracture or aggressive osseous lesion.  There is prominent disc disease L5-S1 with what appears to be small central disc herniation.    1.  Evidence prior wound dehiscence with thickening along the anterior abdominal wall fascia but does not contain any drainable fluid collections or locules of air.  No drainable abscess within the intra-abdominal soft tissues. Additional findings as above.. Signed by Dandre Rondon, DO      Medications     Scheduled medications  aspirin, 81 mg, oral, Daily  atorvastatin, 40 mg, oral, Nightly  benztropine, 1 mg, oral, BID  doxepin, 50 mg, oral, Nightly  gabapentin, 300 mg, oral, Daily  polyethylene glycol, 17 g, oral, Daily  risperiDONE, 2 mg, oral, BID  tamsulosin, 0.4 mg, oral, Daily      Continuous medications     PRN medications  PRN medications: acetaminophen, hydrOXYzine HCL, ondansetron    PLAN     Mr Cedeno is a 58y male with PMHx: COPD, lung nodules, prostate cancer, ETOH and drug abuse who presented with worsening abodminal wound.  In October 2023 underwent an exp lap for SBO and a chronic non healing wound.  He was admitted to the CDU for eval.  Patient states he has been kicked out of several SNFs because they were being mean to him and he would start yelling. He also  left AMA after the surgery in October.  Patient came to the ED and stated he was unhappy with his living situation and that his doctor doesn't think his living situation is good for his open abdominal wound.  Patient was admitted to the CDU but will need snf placement and to be admitted for wound care.   Assessment/Plan:    Open abdominal wound  -wound care consult and appr recs  -pt/ot eval and treat  - SW for placement vs homeless?    HTN  HLD  -c/w home aspirin 81mg daily  -c/w home atorvastatin 40mg daily    Bipolar  -c/w home gabapentin 300mg daily  -c/w home risperidone 2mg bid  -c/w home doxepin 50mg at bedtime  -c/w home benztropine 1mg bid    BPH  -c/w flomax 0.4mg qhs    Fluids: monitor and replete as needed  Electrolytes: monitor and replete as needed  Nutrition: Regular diet   GI prophylaxis: as needed  DVT prophylaxis: SCD  Code Status: Full Code  PCP  Pharmacy    Disposition:  Admitted for above diagnoses. Plan per above. Anticipate hospitalization >2 midnights.       Kari Crook, VAMSHI-CNP    I spent 75 minutes in the professional and overall care of this patient.

## 2024-04-16 NOTE — PROGRESS NOTES
"Progress Note  Clinical Decision Unit  Patient: Bjorn Cedeno  MRN: [id number]  : 1965    Subjective  Bjorn Cedeno has been admitted to the CDU for 6 hours. Serial assessments of Bjorn Cedeno's clinical progress include:  1) Mild abdominal discomfort, no fever or chills  2) Awaiting SNF placement per SW    Objective  Last Recorded Vitals  Blood pressure 118/83, pulse 65, temperature 36.4 °C (97.5 °F), temperature source Oral, resp. rate 17, height 1.88 m (6' 2\"), weight 82.6 kg (182 lb), SpO2 100%.  Physical Exam  Physical Exam  Vitals and nursing note reviewed.   Constitutional:       General: He is not in acute distress.     Appearance: Normal appearance. He is not ill-appearing.   HENT:      Head: Normocephalic and atraumatic.      Right Ear: External ear normal.      Left Ear: External ear normal.      Nose: Nose normal. No congestion or rhinorrhea.      Mouth/Throat:      Mouth: Mucous membranes are moist.      Pharynx: Oropharynx is clear. No oropharyngeal exudate or posterior oropharyngeal erythema.   Eyes:      Extraocular Movements: Extraocular movements intact.      Conjunctiva/sclera: Conjunctivae normal.      Pupils: Pupils are equal, round, and reactive to light.   Cardiovascular:      Rate and Rhythm: Normal rate and regular rhythm.      Heart sounds: Normal heart sounds.   Pulmonary:      Effort: No accessory muscle usage or respiratory distress.      Breath sounds: Normal breath sounds. No wheezing, rhonchi or rales.   Abdominal:      General: Abdomen is flat. Bowel sounds are normal. There is no distension.      Palpations: Abdomen is soft.      Tenderness: There is no abdominal tenderness. There is no right CVA tenderness or left CVA tenderness.      Comments: Abdominal wound in bandage that is clean, dry and intact. No purulent drainage. No increased abdominal pain with palpation; minimal tenderness which he reports is chronic.    Musculoskeletal:         General: No swelling or deformity. " Normal range of motion.      Cervical back: Normal range of motion and neck supple.      Right lower leg: No edema.      Left lower leg: No edema.   Skin:     General: Skin is warm and dry.      Capillary Refill: Capillary refill takes less than 2 seconds.   Neurological:      General: No focal deficit present.      Mental Status: He is alert and oriented to person, place, and time.      GCS: GCS eye subscore is 4. GCS verbal subscore is 5. GCS motor subscore is 6.      Cranial Nerves: Cranial nerves 2-12 are intact.      Sensory: No sensory deficit.      Motor: Motor function is intact. No weakness.   Psychiatric:         Mood and Affect: Mood and affect normal.         Speech: Speech normal.         Behavior: Behavior normal. Behavior is cooperative.         Relevant Results  CT abdomen pelvis w IV contrast    Result Date: 4/15/2024  STUDY: CT Abdomen and Pelvis with IV Contrast; 4/14/2024  11:48 PM. INDICATION: Prior wound dehiscence with recurrent infection. COMPARISON: CT CAP 2/15/2023.  XR hip 7/26/2023. ACCESSION NUMBER(S): [id number] ORDERING CLINICIAN: MYRA JESSICA TECHNIQUE: CT of the abdomen and pelvis was performed.  Contiguous axial images were obtained at 3 mm slice thickness through the abdomen and pelvis. Coronal and sagittal reconstructions at 3 mm slice thickness were performed.  Omnipaque 350 80 mL was administered intravenously.  FINDINGS: LOWER CHEST: No cardiomegaly.  No pericardial effusion.  Lung bases reveals atelectasis type changes left lower lobe.  Right lower lobe atelectasis has resolved.  ABDOMEN:  LIVER: No hepatomegaly.  Smooth surface contour.  Normal attenuation.  BILE DUCTS: No intrahepatic or extrahepatic biliary ductal dilatation.  GALLBLADDER: The gallbladder is unremarkable.. STOMACH: Stomach is fluid-filled and slightly distended. PANCREAS: No masses or ductal dilatation.  SPLEEN: No splenomegaly or focal splenic lesion.  ADRENAL GLANDS: No thickening or nodules.   KIDNEYS AND URETERS: Kidneys are normal in size and location.  No renal or ureteral calculi.  PELVIS:  BLADDER: No abnormalities identified.  REPRODUCTIVE ORGANS: No abnormalities identified.  BOWEL: No abnormalities identified.  VESSELS: No abnormalities identified.  Abdominal aorta is normal in caliber.  PERITONEUM/RETROPERITONEUM/LYMPH NODES: No free fluid.  No pneumoperitoneum.  There is mild periPortal adenopathy demonstrated.  This is similar as compared to previous exams. ABDOMINAL WALL: There is evidence prior wound effusions with thickening of the midline abdominal wall fascia.  There does not appear to be any air within the soft tissues or drainable fluid collections. SOFT TISSUES: No abnormalities identified.  BONES: No acute fracture or aggressive osseous lesion.  There is prominent disc disease L5-S1 with what appears to be small central disc herniation.    1.  Evidence prior wound dehiscence with thickening along the anterior abdominal wall fascia but does not contain any drainable fluid collections or locules of air.  No drainable abscess within the intra-abdominal soft tissues. Additional findings as above.. Signed by Dandre Rondon, DO    No results found for this or any previous visit (from the past 24 hour(s)).    Scheduled medications  polyethylene glycol, 17 g, oral, Daily      Continuous medications     PRN medications  PRN medications: acetaminophen, ondansetron    Principal Problem:    Wound dehiscence      Assessment/Plan  Bjorn Cedeno continues to be managed in accordance with the CDU clinical guidelines for Wound dehiscence. An update of their clinical problem list included:   1) General surgery consulted, appreciate recs. See full note for full details   - Daily dressing change with Aquacel or Wet to dry with NS  - Ensure appropriate nutrition  - Wound care education   2) To go to SNF, PT recommended IP rehab, SW put in referrals  3) Pain control as needed  4) Monitor wound q shift for  decompensation; bleeding, erythema, warmth, purulence      Sayra Russo PA-C  Emergency Medicine/Clinical Decision Unit   [facility]   Available via Secure Chat

## 2024-04-17 PROCEDURE — 97116 GAIT TRAINING THERAPY: CPT | Mod: GP,CQ

## 2024-04-17 PROCEDURE — 2500000006 HC RX 250 W HCPCS SELF ADMINISTERED DRUGS (ALT 637 FOR ALL PAYERS): Mod: SE | Performed by: NURSE PRACTITIONER

## 2024-04-17 PROCEDURE — 99233 SBSQ HOSP IP/OBS HIGH 50: CPT | Performed by: STUDENT IN AN ORGANIZED HEALTH CARE EDUCATION/TRAINING PROGRAM

## 2024-04-17 PROCEDURE — G0378 HOSPITAL OBSERVATION PER HR: HCPCS

## 2024-04-17 PROCEDURE — 2500000005 HC RX 250 GENERAL PHARMACY W/O HCPCS: Mod: SE

## 2024-04-17 PROCEDURE — 97110 THERAPEUTIC EXERCISES: CPT | Mod: GP,CQ

## 2024-04-17 PROCEDURE — 2500000001 HC RX 250 WO HCPCS SELF ADMINISTERED DRUGS (ALT 637 FOR MEDICARE OP): Mod: SE | Performed by: NURSE PRACTITIONER

## 2024-04-17 PROCEDURE — 99231 SBSQ HOSP IP/OBS SF/LOW 25: CPT | Performed by: SURGERY

## 2024-04-17 RX ADMIN — ASPIRIN 81 MG: 81 TABLET, COATED ORAL at 08:56

## 2024-04-17 RX ADMIN — GABAPENTIN 300 MG: 300 CAPSULE ORAL at 08:56

## 2024-04-17 RX ADMIN — TAMSULOSIN HYDROCHLORIDE 0.4 MG: 0.4 CAPSULE ORAL at 08:56

## 2024-04-17 RX ADMIN — ACETAMINOPHEN 650 MG: 325 TABLET ORAL at 20:33

## 2024-04-17 RX ADMIN — DOXEPIN HYDROCHLORIDE 50 MG: 50 CAPSULE ORAL at 20:33

## 2024-04-17 RX ADMIN — ONDANSETRON HYDROCHLORIDE 4 MG: 4 TABLET, FILM COATED ORAL at 00:23

## 2024-04-17 RX ADMIN — RISPERIDONE 2 MG: 0.5 TABLET ORAL at 20:33

## 2024-04-17 RX ADMIN — BENZTROPINE MESYLATE 1 MG: 1 TABLET ORAL at 20:33

## 2024-04-17 RX ADMIN — RISPERIDONE 2 MG: 0.5 TABLET ORAL at 08:56

## 2024-04-17 RX ADMIN — HYDROXYZINE HYDROCHLORIDE 50 MG: 25 TABLET, FILM COATED ORAL at 00:23

## 2024-04-17 RX ADMIN — ATORVASTATIN CALCIUM 40 MG: 40 TABLET, FILM COATED ORAL at 20:33

## 2024-04-17 RX ADMIN — ACETAMINOPHEN 650 MG: 325 TABLET ORAL at 00:23

## 2024-04-17 RX ADMIN — ACETAMINOPHEN 650 MG: 325 TABLET ORAL at 17:28

## 2024-04-17 RX ADMIN — ACETAMINOPHEN 650 MG: 325 TABLET ORAL at 08:56

## 2024-04-17 ASSESSMENT — PAIN - FUNCTIONAL ASSESSMENT: PAIN_FUNCTIONAL_ASSESSMENT: 0-10

## 2024-04-17 ASSESSMENT — PAIN SCALES - GENERAL
PAINLEVEL_OUTOF10: 6
PAINLEVEL_OUTOF10: 7
PAINLEVEL_OUTOF10: 4

## 2024-04-17 ASSESSMENT — COGNITIVE AND FUNCTIONAL STATUS - GENERAL
CLIMB 3 TO 5 STEPS WITH RAILING: A LITTLE
MOBILITY SCORE: 24
PATIENT BASELINE BEDBOUND: NO
MOVING FROM LYING ON BACK TO SITTING ON SIDE OF FLAT BED WITH BEDRAILS: A LITTLE
DAILY ACTIVITIY SCORE: 24
MOBILITY SCORE: 18
STANDING UP FROM CHAIR USING ARMS: A LITTLE
MOVING TO AND FROM BED TO CHAIR: A LITTLE
DAILY ACTIVITIY SCORE: 24
WALKING IN HOSPITAL ROOM: A LITTLE
TURNING FROM BACK TO SIDE WHILE IN FLAT BAD: A LITTLE
MOBILITY SCORE: 24

## 2024-04-17 ASSESSMENT — ACTIVITIES OF DAILY LIVING (ADL)
DRESSING YOURSELF: INDEPENDENT
PATIENT'S MEMORY ADEQUATE TO SAFELY COMPLETE DAILY ACTIVITIES?: YES
JUDGMENT_ADEQUATE_SAFELY_COMPLETE_DAILY_ACTIVITIES: YES
TOILETING: INDEPENDENT
ADEQUATE_TO_COMPLETE_ADL: YES
FEEDING YOURSELF: INDEPENDENT
GROOMING: INDEPENDENT
HEARING - LEFT EAR: FUNCTIONAL
WALKS IN HOME: INDEPENDENT
BATHING: INDEPENDENT
HEARING - RIGHT EAR: FUNCTIONAL

## 2024-04-17 ASSESSMENT — PAIN DESCRIPTION - DESCRIPTORS: DESCRIPTORS: ACHING

## 2024-04-17 NOTE — PROGRESS NOTES
[facility] is able to accept. Spoke with patient at bedside and he is in agreement. Needing updated PT notes for facility to submit for precert. DSC updated. Will continue to monitor for discharge planning needs.   Update 1432: Updated PT notes completed. DSC asked to send notes to facility and to have them submit for precert.  Uzma WEISS, RN  Transitional Care Coordinator (TCC)  [phone]

## 2024-04-17 NOTE — CARE PLAN
The patient's goals for the shift include  to have decreased pain throughout shift    The clinical goals for the shift include free from falls and injury throughout shift    Problem: Nutrition  Goal: Adequate PO fluid intake  Outcome: Progressing  Goal: Nutrition support is meeting 75% of nutrient needs  Outcome: Progressing  Goal: BG  mg/dL  Outcome: Progressing  Goal: Lab values WNL  Outcome: Progressing  Goal: Electrolytes WNL  Outcome: Progressing  Goal: Promote healing  Outcome: Progressing  Goal: Maintain stable weight  Outcome: Progressing  Goal: Gradual weight gain  Outcome: Progressing     Problem: Fall/Injury  Goal: Not fall by end of shift  Outcome: Progressing  Goal: Be free from injury by end of the shift  Outcome: Progressing  Goal: Verbalize understanding of personal risk factors for fall in the hospital  Outcome: Progressing  Goal: Verbalize understanding of risk factor reduction measures to prevent injury from fall in the home  Outcome: Progressing  Goal: Use assistive devices by end of the shift  Outcome: Progressing  Goal: Pace activities to prevent fatigue by end of the shift  Outcome: Progressing     Problem: Pain - Adult  Goal: Verbalizes/displays adequate comfort level or baseline comfort level  Outcome: Progressing     Problem: Safety - Adult  Goal: Free from fall injury  Outcome: Progressing     Problem: Discharge Planning  Goal: Discharge to home or other facility with appropriate resources  Outcome: Progressing     Problem: Chronic Conditions and Co-morbidities  Goal: Patient's chronic conditions and co-morbidity symptoms are monitored and maintained or improved  Outcome: Progressing

## 2024-04-17 NOTE — PROGRESS NOTES
[facility]  ACUTE CARE SURGERY - PROGRESS NOTE    Patient Name: Bjorn Cedeno  MRN: [id number]  Admit Date: [id number]  : 1965  AGE: 58 y.o.   GENDER: male  ==============================================================================  TODAY'S ASSESSMENT AND PLAN OF CARE:  58 y.o. male with history of COPD, lung nodules, bronchiectasis, prostate cancer who & Ex lap, BROOKE with small bowel resection with anastomosis after leaving [facility] for small bowel obstruction in 2023. ACS consulted for wound dehiscence evaluation. Superficial wound that will heal by secondary intention with appropriate wound care. No surgical intervention at this time. However given patient living situation interfering with adequate wound care would recommend SW assistance with living situation prior to discharge. Patient admitted to medicine team pending SNF placement.     Recommendations:  - Appreciate wound care recs for dressing   - Outpatient wound clinic follow-up ordered  - Ensure appropriate nutrition  - Continue to follow with SW regarding dispo & living situation  - ACS will sign off at this time, please reach out with any questions or concerns.     Discussed with attending physician Dr. Lopez.     Kiara Peralta MD  Family Medicine  PGY-1  ACS [phone].     ==============================================================================  CHIEF COMPLAINT / EVENTS LAST 24HRS / HPI:  NAEO. VSS stable.    MEDICAL HISTORY / ROS:   Admission history and ROS reviewed. Pertinent changes as follows:  None.    PHYSICAL EXAM:  Heart Rate:  [58-80]   Temp:  [36.2 °C (97.2 °F)-36.7 °C (98.1 °F)]   Resp:  [15-19]   BP: (103-127)/(66-88)   SpO2:  [98 %-100 %]     Physical Exam  General: well-appearing, NAD  HEENT: NC/AT  Cardiac: regular rate, perfusing well  Lungs: normal work of breathing  Abdomen: soft, mildly tender to palpation around midline wound, non-distended, no guarding, no rebound.  Midline  surgical abdominal wound c/d/I. No surrounding erythema or purulence.    Neuro: grossly intact  MSK: No peripheral edema, cyanosis, or pallor  Psych: no depression, anxiety      IMAGING SUMMARY:  (summary of new imaging findings, not a copy of dictation)  None    LABS:  Results from last 7 days   Lab Units 04/14/24  1750   WBC AUTO x10*3/uL 5.4   HEMOGLOBIN g/dL 15.6   HEMATOCRIT % 44.9   PLATELETS AUTO x10*3/uL 221   NEUTROS PCT AUTO % 46.2   LYMPHS PCT AUTO % 32.1   MONOS PCT AUTO % 16.6   EOS PCT AUTO % 4.2         Results from last 7 days   Lab Units 04/14/24  1750   SODIUM mmol/L 144   POTASSIUM mmol/L 3.8   CHLORIDE mmol/L 104   CO2 mmol/L 30   BUN mg/dL 13   CREATININE mg/dL 1.01   CALCIUM mg/dL 9.8   PROTEIN TOTAL g/dL 8.6*   BILIRUBIN TOTAL mg/dL 0.5   ALK PHOS U/L 63   ALT U/L 16   AST U/L 19   GLUCOSE mg/dL 56*     Results from last 7 days   Lab Units 04/14/24  1750   BILIRUBIN TOTAL mg/dL 0.5           I have reviewed all medications, laboratory results, and imaging pertinent for today's encounter.

## 2024-04-17 NOTE — PROGRESS NOTES
Physical Therapy    Physical Therapy Treatment    Patient Name: Bjorn Cedeno  MRN: [id number]  Today's Date: 4/17/2024  Time Calculation  Start Time: 1317  Stop Time: 1342  Time Calculation (min): 25 min       Assessment/Plan   PT Assessment  PT Assessment Results: Decreased strength, Decreased endurance, Impaired balance, Decreased mobility  Assessment Comment: Pt demos improved stability during ambulation this date with no LE knee buckling. Pt reported slight dizziness throughout mobility.  End of Session Patient Position: Bed, 3 rail up, Alarm off, not on at start of session     PT Plan  Treatment/Interventions: Bed mobility, Transfer training, Gait training, Stair training, Balance training, Strengthening, Endurance training, Therapeutic exercise, Therapeutic activity, Home exercise program  PT Plan: Skilled PT  PT Frequency: 3 times per week  PT Discharge Recommendations: Moderate intensity level of continued care  Equipment Recommended upon Discharge: Wheeled walker  PT Recommended Transfer Status: Contact guard, Assistive device  PT - OK to Discharge: Yes (Eval complete, refer to dispo)      General Visit Information:   PT  Visit  PT Received On: 04/17/24  General  Prior to Session Communication: Bedside nurse  Patient Position Received: Bed, 3 rail up, Alarm off, not on at start of session  General Comment: Pt supine in bed upon arrival. Pt pleasant and agreeable to therapy.    Subjective   Precautions:  Precautions  Medical Precautions: Fall precautions  Post-Surgical Precautions: Abdominal surgery precautions  Vital Signs:       Objective   Pain:     Cognition:  Cognition  Overall Cognitive Status: Within Functional Limits    Activity Tolerance:     Treatments:  Therapeutic Exercise  Therapeutic Exercise Performed: Yes  Therapeutic Exercise Activity 1: seated B LE AROM LAQ, HS curl, heel raises, DF, hip adduction x20    Therapeutic Activity  Therapeutic Activity Performed: Yes  Therapeutic Activity 1: Pt  performed bed mobility, transfers, and gait training.  Therapeutic Activity 2: sitting EOB pt reported slight dizziness upon sitting up that did not subside with prolonged sitting but did not increase with standing.    Bed Mobility  Bed Mobility: Yes  Bed Mobility 1  Bed Mobility 1: Supine to sitting  Level of Assistance 1: Close supervision  Bed Mobility Comments 1: log roll, use of bed rail.  Bed Mobility 2  Bed Mobility  2: Sitting to supine  Level of Assistance 2: Close supervision  Bed Mobility Comments 2: use of log roll.    Ambulation/Gait Training  Ambulation/Gait Training Performed: Yes  Ambulation/Gait Training 1  Surface 1: Level tile  Device 1: Rolling walker  Assistance 1: Contact guard  Quality of Gait 1: Decreased step length  Comments/Distance (ft) 1: 100' with standing rest break.  Transfers  Transfer: Yes  Transfer 1  Transfer From 1: Sit to  Transfer to 1: Stand  Technique 1: Sit to stand  Transfer Device 1: Walker  Transfer Level of Assistance 1: Close supervision  Transfers 2  Transfer From 2: Stand to  Transfer to 2: Sit  Technique 2: Stand to sit  Transfer Device 2: Walker  Transfer Level of Assistance 2: Close supervision    Outcome Measures:  [facility] Basic Mobility  Turning from your back to your side while in a flat bed without using bedrails: A little  Moving from lying on your back to sitting on the side of a flat bed without using bedrails: A little  Moving to and from bed to chair (including a wheelchair): A little  Standing up from a chair using your arms (e.g. wheelchair or bedside chair): A little  To walk in hospital room: A little  Climbing 3-5 steps with railing: A little  Basic Mobility - Total Score: 18    Education Documentation  Home Exercise Program, taught by Tania Encinas PTA at 4/17/2024  2:02 PM.  Learner: Patient  Readiness: Acceptance  Method: Explanation  Response: Verbalizes Understanding    Precautions, taught by Tania Encinas PTA at 4/17/2024  2:02 PM.  Learner:  Patient  Readiness: Acceptance  Method: Explanation  Response: Verbalizes Understanding    Body Mechanics, taught by Tania Encinas PTA at 4/17/2024  2:02 PM.  Learner: Patient  Readiness: Acceptance  Method: Explanation  Response: Verbalizes Understanding    Mobility Training, taught by Tania Encinas PTA at 4/17/2024  2:02 PM.  Learner: Patient  Readiness: Acceptance  Method: Explanation  Response: Verbalizes Understanding    Education Comments  No comments found.        OP EDUCATION:       Encounter Problems       Encounter Problems (Active)       Balance       Pt will score >/= 24/28 on Tinetti balance assessment to indicate low risk for falls (Progressing)       Start:  04/15/24    Expected End:  04/29/24               Mobility       STG - Patient will ambulate >/= 200 ft [place] with LRAD and no evidence of instability (Progressing)       Start:  04/15/24    Expected End:  04/29/24            Patient to ascend/descend >/= 5 stairs with HR and SBA to demo ability to safely traverse stairs within community (Progressing)       Start:  04/15/24    Expected End:  04/29/24               PT Transfers       STG - Patient will perform bed mobility Adarsh with HOB flat and no rails (Progressing)       Start:  04/15/24    Expected End:  04/29/24            STG - Patient will transfer sit to and from stand Adarsh with LRAD (Progressing)       Start:  04/15/24    Expected End:  04/29/24               Pain - Adult

## 2024-04-17 NOTE — CONSULTS
"Nutrition Assessment       Reason for Assessment: Provider consult order    Bjorn Cedeno is a 58 y.o. male on hospital day 0 of admission presenting with Wound dehiscence.     PMHx: COPD, lung nodules, bronchiectasis, prostate cancer who & Ex lap, BROOKE with small bowel resection with anastomosis     Nutrition History:  Energy Intake:  (Lunch tray in room. Patient ate a cheeseburger. Reports eating ledesma x 2 and a pancake for breakfast.)  Food and Nutrient History: Patient states he's had a decreased appetite and PO intake for the past two months. \"My appetite is gone\". Patient reports living in a [place] house and the food was \"terrible\". Denies N/V/C/D. Denies problems chewing or swallowing. Understands the importance of protein for wound healing and agreeable to 3 servings of Ensure a day. States receving Ensure at [facility] house.  Vitamin/Herbal Supplement Use: None per home med list  Food Allergies/Intolerances:  None      Anthropometrics:  Height: 188 cm (6' 2.02\")   Weight: 82.6 kg (182 lb)   BMI (Calculated): 23.36  IBW/kg (Dietitian Calculated): 86.4 kg  Percent of IBW: 95.5 %     Weight History:   Wt Readings from Last 15 Encounters:   04/17/24 82.6 kg (182 lb)   02/02/24 82.6 kg (182 lb 1.6 oz)   12/19/23 78 kg (172 lb)   05/31/23 71.7 kg (158 lb)   05/22/23 69.9 kg (154 lb)   04/25/23 64.4 kg (142 lb)       Weight Change %:  Weight History / % Weight Change: Reports usual wt 187 lbs. Wt loss of 2.7% of unknown time frame.    Nutrition Focused Physical Exam Findings:    Subcutaneous Fat Loss:   Orbital Fat Pads: Mild-Moderate (slight dark circles and slight hollowing)  Buccal Fat Pads: Mild-Moderate (flat cheeks, minimal bounce)  Triceps: Well nourished (ample fat tissue)  Ribs: Well nourished (chest is full, ribs do not show, slight to no protrusion of the iliac crest)  Muscle Wasting:  Temporalis: Mild-Moderate (slight depression)  Pectoralis (Clavicular Region): Well nourished (clavicle not " visible)  Deltoid/Trapezius: Well nourished (rounded appearance at arm, shoulder, neck)  Interosseous: Well nourished (muscle bulges)  Trapezius/Infraspinatus/Supraspinatus (Scapular Region): Defer  Quadriceps: Defer  Gastrocnemius: Defer  Edema:  Edema: none  Physical Findings:  Hair: Negative  Eyes: Negative  Mouth: Negative  Nails: Negative  Skin: Positive (Abdominal wound)    Nutrition Significant Labs:  CBC Trend:   Results from last 7 days   Lab Units 04/14/24  1750   WBC AUTO x10*3/uL 5.4   RBC AUTO x10*6/uL 5.21   HEMOGLOBIN g/dL 15.6   HEMATOCRIT % 44.9   MCV fL 86   PLATELETS AUTO x10*3/uL 221    , BMP Trend:   Results from last 7 days   Lab Units 04/14/24  1750   GLUCOSE mg/dL 56*   CALCIUM mg/dL 9.8   SODIUM mmol/L 144   POTASSIUM mmol/L 3.8   CO2 mmol/L 30   CHLORIDE mmol/L 104   BUN mg/dL 13   CREATININE mg/dL 1.01    , A1C:  Lab Results   Component Value Date    HGBA1C 5.7 (H) 10/13/2023   , BG POCT trend:   Results from last 7 days   Lab Units 04/16/24  0020   POCT GLUCOSE mg/dL 106*    , Renal Lab Trend:   Results from last 7 days   Lab Units 04/14/24  1750   POTASSIUM mmol/L 3.8   SODIUM mmol/L 144   EGFR mL/min/1.73m*2 85   BUN mg/dL 13   CREATININE mg/dL 1.01    , Vit D:   Lab Results   Component Value Date    VITD25 26 (A) 01/25/2023       Nutrition Specific Medications:    atorvastatin, 40 mg, oral, Nightly  polyethylene glycol, 17 g, oral, Daily    Continuous medications     PRN medications  PRN medications: acetaminophen, hydrOXYzine HCL, ondansetron    I/O:    ;      Dietary Orders (From admission, onward)       Start     Ordered    04/17/24 0541  May Participate in Room Service  Once        Question:  .  Answer:  Yes    04/17/24 0541    04/15/24 1648  Adult diet Regular  Diet effective now        Question:  Diet type  Answer:  Regular    04/15/24 1650                     Estimated Needs:   Total Energy Estimated Needs (kCal): 2230 kCal  Method for Estimating Needs: 82.6 kg / 27 kcal  Total  Protein Estimated Needs (g): 115 g  Method for Estimating Needs: 82.6 kg / 1.4 g  Total Fluid Estimated Needs (mL): 2230 mL  Method for Estimating Needs: 1ml per kcal        Nutrition Diagnosis   Malnutrition Diagnosis  Patient has Malnutrition Diagnosis: No    Nutrition Diagnosis  Patient has Nutrition Diagnosis: Yes  Nutrition Diagnosis 1: Increased nutrient needs  Related to (1): wound healing  As Evidenced by (1): abdominal wound       Nutrition Interventions/Recommendations         Nutrition Prescription:  Individualized Nutrition Prescription Provided for : Regular diet.  Vanilla Boost VHC (530 kcal and 22 g protein) daily and Ensure Plus (350 kcal and 13 g protein each) BID ordered by Dietitian. Daily MVI. Obtain Vitamin D level.        Nutrition Interventions:   Interventions: Medical food supplement  Medical Food Supplement: Commercial beverage  Goal: Patient will drink 3 Oral Nutritional Supplements a day.       Nutrition Education:   Encourage Oral Nutritional Supplements and protein sources off menu to promote wound healing.        Nutrition Monitoring and Evaluation   Food/Nutrient Related History Monitoring  Monitoring and Evaluation Plan: Energy intake  Criteria: >/= 75% of meals/supplements    Body Composition/Growth/Weight History  Monitoring and Evaluation Plan: Weight  Criteria: Stable weight    Biochemical Data, Medical Tests and Procedures  Monitoring and Evaluation Plan: Electrolyte/renal panel, Glucose/endocrine profile  Criteria: Labs wnl    Nutrition Focused Physical Findings  Monitoring and Evaluation Plan: Skin  Criteria: Wound will show signs of healing       Time Spent/Follow-up Reminder:   Time Spent (min): 45 minutes  Last Date of Nutrition Visit: 04/17/24  Nutrition Follow-Up Needed?: Dietitian to reassess per policy

## 2024-04-17 NOTE — PROGRESS NOTES
"Bjorn Cedeno is a 58 y.o. male on hospital day 0 of admission presenting with Wound dehiscence.    Subjective     Awaiting placement. [facility] to accept.    Objective     GENERAL APPEARANCE: A&Ox3, appears in no acute distress  HEAD: normocephalic, atraumatic  THROAT: Oral cavity and pharynx normal. No inflammation, swelling, exudate, or lesions.  NECK: Neck supple, non-tender without lymphadenopathy, masses or thyromegaly.  CARDIAC: Normal S1 and S2. No S3, S4 or murmurs. Rhythm is regular. There is no peripheral edema, cyanosis or pallor. Extremities are warm and well perfused. No carotid bruits.  LUNGS: Clear to auscultation bilaterally without rales, rhonchi, wheezing or diminished breath sounds.  ABDOMEN: midline wound with dressing  EXTREMITIES: No significant deformity or joint abnormality. No edema. Peripheral pulses intact. No varicosities.  SKIN: Skin normal color, texture and turgor with no lesions or rash  PSYCHIATRIC: oriented to person, place, and time, good judgement and reason, without hallucinations, abnormal affect or abnormal behaviors during the examination. Patient is not suicidal.        Last Recorded Vitals  Blood pressure 104/69, pulse 66, temperature 36.7 °C (98.1 °F), resp. rate 17, height 1.88 m (6' 2\"), weight 82.6 kg (182 lb), SpO2 100%.  Intake/Output last 3 Shifts:  No intake/output data recorded.    Relevant Results  Lab Results   Component Value Date    WBC 5.4 04/14/2024    HGB 15.6 04/14/2024    HCT 44.9 04/14/2024    MCV 86 04/14/2024     04/14/2024      Lab Results   Component Value Date    GLUCOSE 56 (L) 04/14/2024    CALCIUM 9.8 04/14/2024     04/14/2024    K 3.8 04/14/2024    CO2 30 04/14/2024     04/14/2024    BUN 13 04/14/2024    CREATININE 1.01 04/14/2024     Scheduled medications  aspirin, 81 mg, oral, Daily  atorvastatin, 40 mg, oral, Nightly  benztropine, 1 mg, oral, BID  doxepin, 50 mg, oral, Nightly  gabapentin, 300 mg, oral, Daily  polyethylene " glycol, 17 g, oral, Daily  risperiDONE, 2 mg, oral, BID  tamsulosin, 0.4 mg, oral, Daily      Continuous medications     PRN medications  PRN medications: acetaminophen, hydrOXYzine HCL, ondansetron    Assessment/Plan     Mr Cedeno is a 58y male with PMHx: COPD, lung nodules, prostate cancer, ETOH and drug abuse who presented with worsening abodminal wound.  In October 2023 underwent an exp lap for SBO and a chronic non healing wound.  He was admitted to the CDU for eval.  Patient states he has been kicked out of several SNFs because they were being mean to him and he would start yelling. He also left AMA after the surgery in October.  Patient came to the ED and stated he was unhappy with his living situation and that his doctor doesn't think his living situation is good for his open abdominal wound.  Patient was admitted to the CDU but will need snf placement and to be admitted for wound care.      Open abdominal wound  - wound care on board, recommend:  - Change Every 3 days. Next change is 4/19 noted on the dressing.  Cleanse the wound with normal saline   Apply a strip of hydrofera Blue ready foam to the wound bed.  Cover with a 6x8 mepilex border dressing.  - PT/OT pending, accepted at [facility], awaiting pre-cert     HTN  HLD  - c/w home aspirin 81mg daily  - c/w home atorvastatin 40mg daily     Bipolar  - c/w home gabapentin 300mg daily  - c/w home risperidone 2mg bid  - c/w home doxepin 50mg at bedtime  - c/w home benztropine 1mg bid     BPH  -c/w flomax 0.4mg qhs       Nutrition: Regular diet   GI prophylaxis: as needed  DVT prophylaxis: SCD  Code Status: Full Code  Dispo: Awaiting placement    Kailash Floyd MD     The patient encounter includes all but not limited to; Evaluation of laboratory results, pertinent imaging, and vital signs. Daily updates are discussed with any consulting services and family/medical power of  as needed. The patient's discharge  process begins at admission and  daily contact with the patient's TCC and SW is pertinent in their efficient and safe discharge.

## 2024-04-18 PROCEDURE — G0378 HOSPITAL OBSERVATION PER HR: HCPCS

## 2024-04-18 PROCEDURE — 99232 SBSQ HOSP IP/OBS MODERATE 35: CPT | Performed by: STUDENT IN AN ORGANIZED HEALTH CARE EDUCATION/TRAINING PROGRAM

## 2024-04-18 PROCEDURE — 2500000006 HC RX 250 W HCPCS SELF ADMINISTERED DRUGS (ALT 637 FOR ALL PAYERS): Mod: SE | Performed by: NURSE PRACTITIONER

## 2024-04-18 PROCEDURE — 2500000001 HC RX 250 WO HCPCS SELF ADMINISTERED DRUGS (ALT 637 FOR MEDICARE OP): Mod: SE | Performed by: NURSE PRACTITIONER

## 2024-04-18 RX ADMIN — ATORVASTATIN CALCIUM 40 MG: 40 TABLET, FILM COATED ORAL at 21:17

## 2024-04-18 RX ADMIN — ACETAMINOPHEN 650 MG: 325 TABLET ORAL at 08:43

## 2024-04-18 RX ADMIN — BENZTROPINE MESYLATE 1 MG: 1 TABLET ORAL at 08:43

## 2024-04-18 RX ADMIN — RISPERIDONE 2 MG: 0.5 TABLET ORAL at 21:18

## 2024-04-18 RX ADMIN — BENZTROPINE MESYLATE 1 MG: 1 TABLET ORAL at 21:18

## 2024-04-18 RX ADMIN — RISPERIDONE 2 MG: 0.5 TABLET ORAL at 08:42

## 2024-04-18 RX ADMIN — TAMSULOSIN HYDROCHLORIDE 0.4 MG: 0.4 CAPSULE ORAL at 08:43

## 2024-04-18 RX ADMIN — DOXEPIN HYDROCHLORIDE 50 MG: 50 CAPSULE ORAL at 21:18

## 2024-04-18 RX ADMIN — GABAPENTIN 300 MG: 300 CAPSULE ORAL at 08:41

## 2024-04-18 RX ADMIN — ACETAMINOPHEN 650 MG: 325 TABLET ORAL at 21:18

## 2024-04-18 RX ADMIN — ASPIRIN 81 MG: 81 TABLET, COATED ORAL at 08:41

## 2024-04-18 ASSESSMENT — PAIN SCALES - GENERAL
PAINLEVEL_OUTOF10: 7
PAINLEVEL_OUTOF10: 4

## 2024-04-18 ASSESSMENT — PAIN DESCRIPTION - LOCATION: LOCATION: HEAD

## 2024-04-18 ASSESSMENT — PAIN - FUNCTIONAL ASSESSMENT
PAIN_FUNCTIONAL_ASSESSMENT: 0-10
PAIN_FUNCTIONAL_ASSESSMENT: 0-10

## 2024-04-18 ASSESSMENT — PAIN DESCRIPTION - DESCRIPTORS
DESCRIPTORS: ACHING
DESCRIPTORS: ACHING

## 2024-04-18 NOTE — PROGRESS NOTES
Social Work Note:    ANAID called the [facility] and let them know patient would go to [facility] at discharge. [facility] told this LISW that the patient is no longer in their custody and to call his .  HARRY called patient's probation office [place]  and had to leave a message.  ANAID will continue to follow    SVETLANA Brennan, NICOS

## 2024-04-18 NOTE — CARE PLAN
Problem: Fall/Injury  Goal: Not fall by end of shift  Outcome: Progressing     Problem: Safety - Adult  Goal: Free from fall injury  Outcome: Progressing     Problem: Pain - Adult  Goal: Verbalizes/displays adequate comfort level or baseline comfort level  Outcome: Progressing   The patient's goals for the shift include      The clinical goals for the shift include free from falls and injury throughout shift

## 2024-04-18 NOTE — CARE PLAN
The patient's goals for the shift include  to be free of falls and safe until discharge    The clinical goals for the shift include pt to be free from falls and injury throughout shift      Problem: Nutrition  Goal: Adequate PO fluid intake  Outcome: Progressing  Goal: Nutrition support is meeting 75% of nutrient needs  Outcome: Progressing  Goal: BG  mg/dL  Outcome: Progressing  Goal: Lab values WNL  Outcome: Progressing  Goal: Electrolytes WNL  Outcome: Progressing  Goal: Promote healing  Outcome: Progressing  Goal: Maintain stable weight  Outcome: Progressing  Goal: Gradual weight gain  Outcome: Progressing     Problem: Fall/Injury  Goal: Not fall by end of shift  Outcome: Progressing  Goal: Be free from injury by end of the shift  Outcome: Progressing  Goal: Verbalize understanding of personal risk factors for fall in the hospital  Outcome: Progressing  Goal: Verbalize understanding of risk factor reduction measures to prevent injury from fall in the home  Outcome: Progressing  Goal: Use assistive devices by end of the shift  Outcome: Progressing  Goal: Pace activities to prevent fatigue by end of the shift  Outcome: Progressing     Problem: Pain - Adult  Goal: Verbalizes/displays adequate comfort level or baseline comfort level  Outcome: Progressing     Problem: Safety - Adult  Goal: Free from fall injury  Outcome: Progressing     Problem: Discharge Planning  Goal: Discharge to home or other facility with appropriate resources  Outcome: Progressing     Problem: Chronic Conditions and Co-morbidities  Goal: Patient's chronic conditions and co-morbidity symptoms are monitored and maintained or improved  Outcome: Progressing

## 2024-04-18 NOTE — PROGRESS NOTES
"Bjorn Cedeno is a 58 y.o. male on hospital day 0 of admission presenting with Wound dehiscence.    Subjective     The patient complains of some nausea today. Otherwise, no complaints. Awaiting placement.    Objective     GENERAL APPEARANCE: A&Ox3, appears in no acute distress  HEAD: normocephalic, atraumatic  THROAT: Oral cavity and pharynx normal. No inflammation, swelling, exudate, or lesions.  NECK: Neck supple, non-tender without lymphadenopathy, masses or thyromegaly.  CARDIAC: Normal S1 and S2. No S3, S4 or murmurs. Rhythm is regular. There is no peripheral edema, cyanosis or pallor. Extremities are warm and well perfused. No carotid bruits.  LUNGS: Clear to auscultation bilaterally without rales, rhonchi, wheezing or diminished breath sounds.  ABDOMEN: Positive bowel sounds. Soft, nondistended, nontender. No guarding or rebound. No masses.  EXTREMITIES: No significant deformity or joint abnormality. No edema. Peripheral pulses intact. No varicosities.  SKIN: Skin normal color, texture and turgor with no lesions or rash  PSYCHIATRIC: oriented to person, place, and time, good judgement and reason, without hallucinations, abnormal affect or abnormal behaviors during the examination. Patient is not suicidal.        Last Recorded Vitals  Blood pressure 95/57, pulse 64, temperature 36.4 °C (97.5 °F), temperature source Temporal, resp. rate 16, height 1.88 m (6' 2.02\"), weight 82.6 kg (182 lb), SpO2 98%.  Intake/Output last 3 Shifts:  I/O last 3 completed shifts:  In: - (0 mL/kg)   Out: 350 (4.2 mL/kg) [Urine:350 (0.1 mL/kg/hr)]  Weight: 82.6 kg     Relevant Results  Lab Results   Component Value Date    WBC 5.4 04/14/2024    HGB 15.6 04/14/2024    HCT 44.9 04/14/2024    MCV 86 04/14/2024     04/14/2024      Lab Results   Component Value Date    GLUCOSE 56 (L) 04/14/2024    CALCIUM 9.8 04/14/2024     04/14/2024    K 3.8 04/14/2024    CO2 30 04/14/2024     04/14/2024    BUN 13 04/14/2024    " CREATININE 1.01 04/14/2024     Scheduled medications  aspirin, 81 mg, oral, Daily  atorvastatin, 40 mg, oral, Nightly  benztropine, 1 mg, oral, BID  doxepin, 50 mg, oral, Nightly  gabapentin, 300 mg, oral, Daily  polyethylene glycol, 17 g, oral, Daily  risperiDONE, 2 mg, oral, BID  tamsulosin, 0.4 mg, oral, Daily      Continuous medications     PRN medications  PRN medications: acetaminophen, hydrOXYzine HCL, ondansetron    Assessment/Plan     Mr Cedeno is a 58y male with PMHx: COPD, lung nodules, prostate cancer, ETOH and drug abuse who presented with worsening abodminal wound.  In October 2023 underwent an exp lap for SBO and a chronic non healing wound.  He was admitted to the CDU for eval.  Patient states he has been kicked out of several SNFs because they were being mean to him and he would start yelling. He also left AMA after the surgery in October.  Patient came to the ED and stated he was unhappy with his living situation and that his doctor doesn't think his living situation is good for his open abdominal wound.  Patient was admitted to the CDU but will need snf placement and to be admitted for wound care.      Open abdominal wound  - wound care on board, recommend:  - Change Every 3 days. Next change is 4/19 noted on the dressing.  Cleanse the wound with normal saline   Apply a strip of hydrofera Blue ready foam to the wound bed.  Cover with a 6x8 mepilex border dressing.  - PT/OT pending, accepted at [facility] SNF, awaiting pre-cert, medically stable for discharge     HTN  HLD  - c/w home aspirin 81mg daily  - c/w home atorvastatin 40mg daily     Bipolar  - c/w home gabapentin 300mg daily  - c/w home risperidone 2mg bid  - c/w home doxepin 50mg at bedtime  - c/w home benztropine 1mg bid     BPH  -c/w flomax 0.4mg qhs        Nutrition: Regular diet   GI prophylaxis: as needed  DVT prophylaxis: SCD  Code Status: Full Code  Dispo: Awaiting placement, medically stable for discharge     Kailash Floyd MD      The patient encounter includes all but not limited to; Evaluation of laboratory results, pertinent imaging, and vital signs. Daily updates are discussed with any consulting services and family/medical power of  as needed. The patient's discharge  process begins at admission and daily contact with the patient's TCC and SW is pertinent in their efficient and safe discharge.

## 2024-04-18 NOTE — PROGRESS NOTES
Transitional Care Coordination Progress Note:  Patient discussed during interdisciplinary rounds.   Team members present: DIOGENES CHRISTIANSON  Plan per Medical/Surgical team: Wound dehiscence  Payor: [employer]  Discharge disposition: [facility]  Potential Barriers: insurance authorization  ADOD: 1-2 days  Precert remains pending for SNF. Will continue to monitor for discharge planning needs.     Uzma WEISS, RN  Transitional Care Coordinator (TCC)  [phone]

## 2024-04-19 PROCEDURE — 2500000006 HC RX 250 W HCPCS SELF ADMINISTERED DRUGS (ALT 637 FOR ALL PAYERS): Mod: SE | Performed by: NURSE PRACTITIONER

## 2024-04-19 PROCEDURE — 2500000001 HC RX 250 WO HCPCS SELF ADMINISTERED DRUGS (ALT 637 FOR MEDICARE OP): Mod: SE | Performed by: NURSE PRACTITIONER

## 2024-04-19 PROCEDURE — 99232 SBSQ HOSP IP/OBS MODERATE 35: CPT | Performed by: STUDENT IN AN ORGANIZED HEALTH CARE EDUCATION/TRAINING PROGRAM

## 2024-04-19 PROCEDURE — G0378 HOSPITAL OBSERVATION PER HR: HCPCS

## 2024-04-19 RX ADMIN — ACETAMINOPHEN 650 MG: 325 TABLET ORAL at 01:04

## 2024-04-19 RX ADMIN — DOXEPIN HYDROCHLORIDE 50 MG: 50 CAPSULE ORAL at 21:40

## 2024-04-19 RX ADMIN — RISPERIDONE 2 MG: 0.5 TABLET ORAL at 21:40

## 2024-04-19 RX ADMIN — ASPIRIN 81 MG: 81 TABLET, COATED ORAL at 08:26

## 2024-04-19 RX ADMIN — BENZTROPINE MESYLATE 1 MG: 1 TABLET ORAL at 08:26

## 2024-04-19 RX ADMIN — BENZTROPINE MESYLATE 1 MG: 1 TABLET ORAL at 21:40

## 2024-04-19 RX ADMIN — RISPERIDONE 2 MG: 0.5 TABLET ORAL at 08:26

## 2024-04-19 RX ADMIN — ACETAMINOPHEN 650 MG: 325 TABLET ORAL at 08:26

## 2024-04-19 RX ADMIN — ATORVASTATIN CALCIUM 40 MG: 40 TABLET, FILM COATED ORAL at 21:40

## 2024-04-19 RX ADMIN — TAMSULOSIN HYDROCHLORIDE 0.4 MG: 0.4 CAPSULE ORAL at 08:26

## 2024-04-19 RX ADMIN — GABAPENTIN 300 MG: 300 CAPSULE ORAL at 08:26

## 2024-04-19 RX ADMIN — ACETAMINOPHEN 650 MG: 325 TABLET ORAL at 21:40

## 2024-04-19 ASSESSMENT — COGNITIVE AND FUNCTIONAL STATUS - GENERAL
STANDING UP FROM CHAIR USING ARMS: A LITTLE
DAILY ACTIVITIY SCORE: 24
MOBILITY SCORE: 20
CLIMB 3 TO 5 STEPS WITH RAILING: A LITTLE
MOVING TO AND FROM BED TO CHAIR: A LITTLE
WALKING IN HOSPITAL ROOM: A LITTLE

## 2024-04-19 ASSESSMENT — PAIN SCALES - GENERAL
PAINLEVEL_OUTOF10: 7
PAINLEVEL_OUTOF10: 3

## 2024-04-19 ASSESSMENT — PAIN - FUNCTIONAL ASSESSMENT: PAIN_FUNCTIONAL_ASSESSMENT: 0-10

## 2024-04-19 NOTE — CARE PLAN
Per attending medically cleared.  Precert Is still pending with [facility].  Will continue to monitor.....Taina Contreras RN, BSN, TCC  UPDATE:  Per [facility] auth has been obtained for [facility] SNF.  Attempted to schedule transport for this evening but [facility] gave transport time of midnight.  SNF not agreeable to this.  Rescheduled for noon tomorrow.  Asked SW to notify .  Waiting on SNF to give nurse to nurse report number.  Bedside nurse updated.  Attending updated.....Taina Contreras RN, BSN, TCC  UPDATE:  Nurse to nurse report number is [phone].  Taina Contreras RN, BSN, TCC

## 2024-04-19 NOTE — PROGRESS NOTES
"Bjorn Cedeno is a 58 y.o. male on hospital day 0 of admission presenting with Wound dehiscence.    Subjective     Precert accepted. Transport could not be set until late tonight, so dc will be tomorrow with transport set at noon. Medically stable for discharge.    Objective     GENERAL APPEARANCE: A&Ox3, appears in no acute distress  HEAD: normocephalic, atraumatic  THROAT: Oral cavity and pharynx normal. No inflammation, swelling, exudate, or lesions.  NECK: Neck supple, non-tender without lymphadenopathy, masses or thyromegaly.  CARDIAC: Normal S1 and S2. No S3, S4 or murmurs. Rhythm is regular. There is no peripheral edema, cyanosis or pallor. Extremities are warm and well perfused. No carotid bruits.  LUNGS: Clear to auscultation bilaterally without rales, rhonchi, wheezing or diminished breath sounds.  ABDOMEN: Positive bowel sounds. Soft, nondistended, nontender. No guarding or rebound. No masses.  EXTREMITIES: No significant deformity or joint abnormality. No edema. Peripheral pulses intact. No varicosities.  SKIN: Skin normal color, texture and turgor with no lesions or rash  PSYCHIATRIC: oriented to person, place, and time, good judgement and reason, without hallucinations, abnormal affect or abnormal behaviors during the examination. Patient is not suicidal.        Last Recorded Vitals  Blood pressure 119/75, pulse 76, temperature 36.7 °C (98.1 °F), resp. rate 16, height 1.88 m (6' 2.02\"), weight 82.6 kg (182 lb), SpO2 97%.  Intake/Output last 3 Shifts:  I/O last 3 completed shifts:  In: - (0 mL/kg)   Out: 875 (10.6 mL/kg) [Urine:875 (0.3 mL/kg/hr)]  Weight: 82.6 kg     Relevant Results  Lab Results   Component Value Date    WBC 5.4 04/14/2024    HGB 15.6 04/14/2024    HCT 44.9 04/14/2024    MCV 86 04/14/2024     04/14/2024      Lab Results   Component Value Date    GLUCOSE 56 (L) 04/14/2024    CALCIUM 9.8 04/14/2024     04/14/2024    K 3.8 04/14/2024    CO2 30 04/14/2024     04/14/2024 "    BUN 13 04/14/2024    CREATININE 1.01 04/14/2024     Scheduled medications  aspirin, 81 mg, oral, Daily  atorvastatin, 40 mg, oral, Nightly  benztropine, 1 mg, oral, BID  doxepin, 50 mg, oral, Nightly  gabapentin, 300 mg, oral, Daily  polyethylene glycol, 17 g, oral, Daily  risperiDONE, 2 mg, oral, BID  tamsulosin, 0.4 mg, oral, Daily      Continuous medications     PRN medications  PRN medications: acetaminophen, hydrOXYzine HCL, ondansetron    Assessment/Plan     Mr Cedeno is a 58y male with PMHx: COPD, lung nodules, prostate cancer, ETOH and drug abuse who presented with worsening abodminal wound.  In October 2023 underwent an exp lap for SBO and a chronic non healing wound.  He was admitted to the CDU for eval.  Patient states he has been kicked out of several SNFs because they were being mean to him and he would start yelling. He also left AMA after the surgery in October.  Patient came to the ED and stated he was unhappy with his living situation and that his doctor doesn't think his living situation is good for his open abdominal wound.  Patient was admitted to the CDU but will need snf placement and to be admitted for wound care.      Open abdominal wound  - wound care on board, recommend:  - Change Every 3 days. Next change is 4/19 noted on the dressing.  Cleanse the wound with normal saline   Apply a strip of hydrofera Blue ready foam to the wound bed.  Cover with a 6x8 mepilex border dressing.  - accepted at [facility], Precert approved, however, transport could not be set up until late tonight and will be set for noon tomorrow     HTN  HLD  - c/w home aspirin 81mg daily  - c/w home atorvastatin 40mg daily     Bipolar  - c/w home gabapentin 300mg daily  - c/w home risperidone 2mg bid  - c/w home doxepin 50mg at bedtime  - c/w home benztropine 1mg bid     BPH  -c/w flomax 0.4mg qhs        Nutrition: Regular diet   GI prophylaxis: as needed  DVT prophylaxis: SCD  Code Status: Full Code  Dispo: DC  tomorrow, transport set for noon     Kailash Floyd MD     The patient encounter includes all but not limited to; Evaluation of laboratory results, pertinent imaging, and vital signs. Daily updates are discussed with any consulting services and family/medical power of  as needed. The patient's discharge  process begins at admission and daily contact with the patient's TCC and SW is pertinent in their efficient and safe discharge.

## 2024-04-19 NOTE — CARE PLAN
The patient's goals for the shift include      The clinical goals for the shift include pt to be free from falls and injury throughout shift    Over the shift, the patient did  make progress toward the following goals.

## 2024-04-20 VITALS
BODY MASS INDEX: 23.36 KG/M2 | OXYGEN SATURATION: 95 % | RESPIRATION RATE: 18 BRPM | SYSTOLIC BLOOD PRESSURE: 111 MMHG | HEIGHT: 74 IN | HEART RATE: 79 BPM | TEMPERATURE: 97.7 F | WEIGHT: 182 LBS | DIASTOLIC BLOOD PRESSURE: 68 MMHG

## 2024-04-20 PROCEDURE — 2500000006 HC RX 250 W HCPCS SELF ADMINISTERED DRUGS (ALT 637 FOR ALL PAYERS): Mod: SE | Performed by: NURSE PRACTITIONER

## 2024-04-20 PROCEDURE — 2500000001 HC RX 250 WO HCPCS SELF ADMINISTERED DRUGS (ALT 637 FOR MEDICARE OP): Mod: SE | Performed by: NURSE PRACTITIONER

## 2024-04-20 PROCEDURE — G0378 HOSPITAL OBSERVATION PER HR: HCPCS

## 2024-04-20 PROCEDURE — 99232 SBSQ HOSP IP/OBS MODERATE 35: CPT | Performed by: STUDENT IN AN ORGANIZED HEALTH CARE EDUCATION/TRAINING PROGRAM

## 2024-04-20 RX ADMIN — ATORVASTATIN CALCIUM 40 MG: 40 TABLET, FILM COATED ORAL at 21:02

## 2024-04-20 RX ADMIN — DOXEPIN HYDROCHLORIDE 50 MG: 50 CAPSULE ORAL at 21:02

## 2024-04-20 RX ADMIN — TAMSULOSIN HYDROCHLORIDE 0.4 MG: 0.4 CAPSULE ORAL at 09:08

## 2024-04-20 RX ADMIN — GABAPENTIN 300 MG: 300 CAPSULE ORAL at 09:08

## 2024-04-20 RX ADMIN — BENZTROPINE MESYLATE 1 MG: 1 TABLET ORAL at 09:08

## 2024-04-20 RX ADMIN — RISPERIDONE 2 MG: 0.5 TABLET ORAL at 09:08

## 2024-04-20 RX ADMIN — RISPERIDONE 2 MG: 0.5 TABLET ORAL at 21:01

## 2024-04-20 RX ADMIN — BENZTROPINE MESYLATE 1 MG: 1 TABLET ORAL at 21:01

## 2024-04-20 RX ADMIN — ACETAMINOPHEN 650 MG: 325 TABLET ORAL at 09:11

## 2024-04-20 RX ADMIN — ASPIRIN 81 MG: 81 TABLET, COATED ORAL at 09:08

## 2024-04-20 ASSESSMENT — COGNITIVE AND FUNCTIONAL STATUS - GENERAL
PERSONAL GROOMING: A LITTLE
STANDING UP FROM CHAIR USING ARMS: A LITTLE
DRESSING REGULAR LOWER BODY CLOTHING: A LITTLE
DRESSING REGULAR LOWER BODY CLOTHING: A LITTLE
MOBILITY SCORE: 19
DRESSING REGULAR UPPER BODY CLOTHING: A LITTLE
WALKING IN HOSPITAL ROOM: A LITTLE
MOVING TO AND FROM BED TO CHAIR: A LITTLE
TOILETING: A LITTLE
CLIMB 3 TO 5 STEPS WITH RAILING: A LOT
MOVING TO AND FROM BED TO CHAIR: A LITTLE
DRESSING REGULAR UPPER BODY CLOTHING: A LITTLE
STANDING UP FROM CHAIR USING ARMS: A LITTLE
DAILY ACTIVITIY SCORE: 19
TOILETING: A LITTLE
PERSONAL GROOMING: A LITTLE
MOBILITY SCORE: 19
DAILY ACTIVITIY SCORE: 19
WALKING IN HOSPITAL ROOM: A LITTLE
CLIMB 3 TO 5 STEPS WITH RAILING: A LOT
HELP NEEDED FOR BATHING: A LITTLE
HELP NEEDED FOR BATHING: A LITTLE

## 2024-04-20 ASSESSMENT — PAIN DESCRIPTION - LOCATION: LOCATION: ABDOMEN

## 2024-04-20 ASSESSMENT — PAIN - FUNCTIONAL ASSESSMENT
PAIN_FUNCTIONAL_ASSESSMENT: 0-10

## 2024-04-20 ASSESSMENT — PAIN SCALES - GENERAL
PAINLEVEL_OUTOF10: 7
PAINLEVEL_OUTOF10: 6
PAINLEVEL_OUTOF10: 4

## 2024-04-20 ASSESSMENT — PAIN DESCRIPTION - DESCRIPTORS: DESCRIPTORS: ACHING

## 2024-04-20 NOTE — PROGRESS NOTES
"Bjorn Cedeno is a 58 y.o. male on hospital day 0 of admission presenting with Wound dehiscence.    Subjective     Patient transport delayed again as transport company stated noon time pickup never set. Facility stated cannot take patient after 5pm, new transport time is tomorrow at 8am.    Objective     GENERAL APPEARANCE: A&Ox3, appears in no acute distress  HEAD: normocephalic, atraumatic  THROAT: Oral cavity and pharynx normal. No inflammation, swelling, exudate, or lesions.  NECK: Neck supple, non-tender without lymphadenopathy, masses or thyromegaly.  CARDIAC: Normal S1 and S2. No S3, S4 or murmurs. Rhythm is regular. There is no peripheral edema, cyanosis or pallor. Extremities are warm and well perfused. No carotid bruits.  LUNGS: Clear to auscultation bilaterally without rales, rhonchi, wheezing or diminished breath sounds.  ABDOMEN: Positive bowel sounds. Soft, nondistended, nontender. No guarding or rebound. No masses.  EXTREMITIES: No significant deformity or joint abnormality. No edema. Peripheral pulses intact. No varicosities.  SKIN: Skin normal color, texture and turgor with no lesions or rash  PSYCHIATRIC: oriented to person, place, and time, good judgement and reason, without hallucinations, abnormal affect or abnormal behaviors during the examination. Patient is not suicidal.        Last Recorded Vitals  Blood pressure 114/73, pulse 76, temperature 36.5 °C (97.7 °F), resp. rate 17, height 1.88 m (6' 2.02\"), weight 82.6 kg (182 lb), SpO2 97%.  Intake/Output last 3 Shifts:  I/O last 3 completed shifts:  In: - (0 mL/kg)   Out: 1675 (20.3 mL/kg) [Urine:1675 (0.6 mL/kg/hr)]  Weight: 82.6 kg     Relevant Results  Lab Results   Component Value Date    WBC 5.4 04/14/2024    HGB 15.6 04/14/2024    HCT 44.9 04/14/2024    MCV 86 04/14/2024     04/14/2024      Lab Results   Component Value Date    GLUCOSE 56 (L) 04/14/2024    CALCIUM 9.8 04/14/2024     04/14/2024    K 3.8 04/14/2024    CO2 30 " 04/14/2024     04/14/2024    BUN 13 04/14/2024    CREATININE 1.01 04/14/2024     Scheduled medications  aspirin, 81 mg, oral, Daily  atorvastatin, 40 mg, oral, Nightly  benztropine, 1 mg, oral, BID  doxepin, 50 mg, oral, Nightly  gabapentin, 300 mg, oral, Daily  polyethylene glycol, 17 g, oral, Daily  risperiDONE, 2 mg, oral, BID  tamsulosin, 0.4 mg, oral, Daily      Continuous medications     PRN medications  PRN medications: acetaminophen, hydrOXYzine HCL, ondansetron    Assessment/Plan     Mr Cedeno is a 58y male with PMHx: COPD, lung nodules, prostate cancer, ETOH and drug abuse who presented with worsening abodminal wound.  In October 2023 underwent an exp lap for SBO and a chronic non healing wound.  He was admitted to the CDU for eval.  Patient states he has been kicked out of several SNFs because they were being mean to him and he would start yelling. He also left AMA after the surgery in October.  Patient came to the ED and stated he was unhappy with his living situation and that his doctor doesn't think his living situation is good for his open abdominal wound.  Patient was admitted to the CDU but will need snf placement and to be admitted for wound care.      Open abdominal wound  - wound care on board, recommend:  - Change Every 3 days. Next change is 4/19 noted on the dressing.  Cleanse the wound with normal saline   Apply a strip of hydrofera Blue ready foam to the wound bed.  Cover with a 6x8 mepilex border dressing.  [facility]  - Transport company stated time of noon was never set for today, and facility said they cannot take the patient after 5pm, transport now delayed until 8am tomorrow     HTN  HLD  - c/w home aspirin 81mg daily  - c/w home atorvastatin 40mg daily     Bipolar  - c/w home gabapentin 300mg daily  - c/w home risperidone 2mg bid  - c/w home doxepin 50mg at bedtime  - c/w home benztropine 1mg bid     BPH  -c/w flomax 0.4mg qhs         Nutrition: Regular diet   GI prophylaxis: as needed  DVT prophylaxis: SCD  Code Status: Full Code  Dispo: DC tomorrow, transport company said new  time was never set, and facility stated cannot take him after 5pm, new time set for 8am Monday     Kailash Floyd MD     The patient encounter includes all but not limited to; Evaluation of laboratory results, pertinent imaging, and vital signs. Daily updates are discussed with any consulting services and family/medical power of  as needed. The patient's discharge  process begins at admission and daily contact with the patient's TCC and SW is pertinent in their efficient and safe discharge.

## 2024-04-20 NOTE — CARE PLAN
Problem: Fall/Injury  Goal: Not fall by end of shift  Outcome: Progressing     Problem: Pain - Adult  Goal: Verbalizes/displays adequate comfort level or baseline comfort level  Outcome: Progressing     Problem: Safety - Adult  Goal: Free from fall injury  Outcome: Progressing   The patient's goals for the shift include      The clinical goals for the shift include Pt will remain HDS

## 2024-04-20 NOTE — PROGRESS NOTES
Bjorn Cedeno is a 58 y.o. male on day 0 of admission presenting with Wound dehiscence.    Transitional Care Coordinator Note: Patient to be discharged to [facility] at noon. All care coordination arranged 4/19 by TCC, charge RN verbalized having report number via TCC note and report called. No further needs from TCC team at this time. NICOLE Adamson via Epic.    Update- Message received from bedside RN at 14:50pm- Call received from [facility] that transport has been cancelled. TCC spoke with [facility] dispatch of whom indicated patient's orginally  4/19 wsa cancelled due to late , but was never rescheduled in round trip. TCC spoke with receiving facility which indicated patient cannot arrive after 5pm today and [facility] next  would be 9:30pm. TCC rescheduled transport for 8am( awaiting confirmation). Provider team and management updated.NICOLE Adamson via Epic.

## 2024-04-20 NOTE — CARE PLAN
Problem: Nutrition  Goal: Adequate PO fluid intake  Outcome: Progressing  Goal: Electrolytes WNL  Outcome: Progressing  Goal: Promote healing  Outcome: Progressing     Problem: Fall/Injury  Goal: Not fall by end of shift  Outcome: Progressing  Goal: Be free from injury by end of the shift  Outcome: Progressing  Goal: Verbalize understanding of risk factor reduction measures to prevent injury from fall in the home  Outcome: Progressing  Goal: Pace activities to prevent fatigue by end of the shift  Outcome: Progressing     Problem: Safety - Adult  Goal: Free from fall injury  Outcome: Progressing     Problem: Discharge Planning  Goal: Discharge to home or other facility with appropriate resources  Outcome: Progressing     Problem: Chronic Conditions and Co-morbidities  Goal: Patient's chronic conditions and co-morbidity symptoms are monitored and maintained or improved  Outcome: Progressing   The patient's goals for the shift include      The clinical goals for the shift include pt will discharge during shift

## 2024-04-21 PROCEDURE — G0378 HOSPITAL OBSERVATION PER HR: HCPCS

## 2024-04-21 PROCEDURE — 99239 HOSP IP/OBS DSCHRG MGMT >30: CPT | Performed by: STUDENT IN AN ORGANIZED HEALTH CARE EDUCATION/TRAINING PROGRAM

## 2024-05-23 NOTE — DISCHARGE SUMMARY
Discharge Diagnosis  Wound dehiscence    Issues Requiring Follow-Up  N/A    Test Results Pending At Discharge  N/A    Hospital Course    Mr Cedeno is a 58y male with PMHx: COPD, lung nodules, prostate cancer, ETOH and drug abuse who presented with worsening abodminal wound.  In October 2023 underwent an exp lap for SBO and a chronic non healing wound.  He was admitted to the CDU for eval.  Patient states he has been kicked out of several SNFs because they were being mean to him and he would start yelling. He also left AMA after the surgery in October.  Patient came to the ED and stated he was unhappy with his living situation and that his doctor doesn't think his living situation is good for his open abdominal wound.  Patient was admitted to the CDU but will need snf placement and to be admitted for wound care.      Open abdominal wound  - wound care on board, recommend:  - Change Every 3 days. Next change is 4/19 noted on the dressing.  Cleanse the wound with normal saline   Apply a strip of hydrofera Blue ready foam to the wound bed.  Cover with a 6x8 mepilex border dressing.  - accepted at [facility], Precert approved    The patient was seen by wound care for abdominal wound and instructions given to [facility]. He was discharged in stable condition with no sign of an infected wound.    Pertinent Physical Exam At Time of Discharge  GENERAL APPEARANCE: A&Ox3, appears in no acute distress  HEAD: normocephalic, atraumatic  THROAT: Oral cavity and pharynx normal. No inflammation, swelling, exudate, or lesions.  NECK: Neck supple, non-tender without lymphadenopathy, masses or thyromegaly.  CARDIAC: Normal S1 and S2. No S3, S4 or murmurs. Rhythm is regular. There is no peripheral edema, cyanosis or pallor. Extremities are warm and well perfused. No carotid bruits.  LUNGS: Clear to auscultation bilaterally without rales, rhonchi, wheezing or diminished breath sounds.  ABDOMEN: large abdominal wound, no  "discharge or surrounding erythema.  EXTREMITIES: No significant deformity or joint abnormality. No edema. Peripheral pulses intact. No varicosities.  SKIN: Skin normal color, texture and turgor with no lesions or rash  PSYCHIATRIC: oriented to person, place, and time, good judgement and reason, without hallucinations, abnormal affect or abnormal behaviors during the examination. Patient is not suicidal.        Home Medications     Medication List      CONTINUE taking these medications     acetaminophen 325 mg tablet; Commonly known as: Tylenol; Take 2 tablets   (650 mg) by mouth every 6 hours if needed for mild pain (1 - 3).   Aquacel AG Foam 1.2 %- 8\" X 8\" bandage; Generic drug: silver-foam   bandage; Apply 1 each topically once daily.   aspirin 81 mg EC tablet   atorvastatin 40 mg tablet; Commonly known as: Lipitor; Take 1 tablet (40   mg) by mouth once daily at bedtime.   benztropine 1 mg tablet; Commonly known as: Cogentin   Curity Abdominal Pad 8 X 10 \" bandage; Generic drug: non-adherent   bandage; Apply 1 each topically once daily.   doxepin 50 mg capsule; Commonly known as: SINEquan   gabapentin 300 mg capsule; Commonly known as: Neurontin   hydrOXYzine HCL 50 mg tablet; Commonly known as: Atarax   risperiDONE 2 mg tablet; Commonly known as: RisperDAL   tamsulosin 0.4 mg 24 hr capsule; Commonly known as: Flomax       Outpatient Follow-Up  No future appointments.    Chris Floyd MD  "

## 2025-01-16 NOTE — PROGRESS NOTES
Physical Therapy    Physical Therapy Evaluation & Treatment    Patient Name: Bojrn Cedeno  MRN: [id number]  Today's Date: 4/15/2024   Time Calculation  Start Time: 1437  Stop Time: 1504  Time Calculation (min): 27 min    Assessment/Plan   PT Assessment  PT Assessment Results: Decreased strength, Decreased endurance, Impaired balance, Decreased mobility, Pain  Rehab Prognosis: Good  Evaluation/Treatment Tolerance: Patient tolerated treatment well  Medical Staff Made Aware: Yes  Strengths: Ability to acquire knowledge, Attitude of self, Premorbid level of function  Barriers to Participation: Living arrangement secure, Comorbidities, Support and attitude of living partners  End of Session Communication: Bedside nurse  Assessment Comment: Pt. is a 58 yom that presents with impairments including increased abdominal and back pain, decreased functional strength, decreased activity tolerance/endurance, impaired balance, and increased difficulty with functional mobility compared to baseline level of function. Pt. will benefit from skilled PT intervention while inpatient to address the above deficits and maximize return to PLOF.   End of Session Patient Position:  (stretcher, 2 rails up, call light in reach)     IP OR SWING BED PT PLAN  Inpatient or Swing Bed: Inpatient  PT Plan  Treatment/Interventions: Bed mobility, Transfer training, Gait training, Stair training, Balance training, Strengthening, Endurance training, Therapeutic exercise, Therapeutic activity, Home exercise program  PT Plan: Skilled PT  PT Frequency: 3 times per week  PT Discharge Recommendations: Moderate intensity level of continued care  Equipment Recommended upon Discharge: Wheeled walker  PT Recommended Transfer Status: Contact guard, Assistive device  PT - OK to Discharge: Yes (Eval complete, refer to dispo)      Subjective     General Visit Information:  General  Reason for Referral: Increasing wound drainage from abdominal wound  Past Medical History  "Relevant to Rehab: COPD, lung nodules, bronchiectasis, prostate cancer, s/p ex lap, BROOKE, and small bowel resection with anastomosis in Jan 2023  Family/Caregiver Present: No  Prior to Session Communication: Bedside nurse  Patient Position Received:  (stretcher, 2 rails up)  Preferred Learning Style: auditory, verbal  General Comment: Pt. received supine in bed, agreeable to participate    Home Living:  Home Living  Type of Home: Group home (People repeatedly referring to place as \"[facility]\")  Lives With:  (Pt. reports stays in a room with 30 other people)  Home Adaptive Equipment:  (Reports had a cane though it was recently stolen)  Home Layout: Stairs to alternate level with rails, Bed/bath upstairs  Home Access: Stairs to enter with rails  Home Living Comments: Pt. unable to elaborate group home set up, reports has to negotiate flight of stairs to negotiate bed/bathroom and to get to \"chow xie\"    Prior Level of Function:  Prior Function Per Pt/Caregiver Report  Level of Riley: Independent with ADLs and functional transfers, Independent with homemaking with ambulation  ADL Assistance: Independent  Homemaking Assistance: Independent  Ambulatory Assistance: Independent  Vocational: On disability  Prior Function Comments: Pt. reports 2 recent falls    Precautions:  Precautions  Hearing/Visual Limitations: WFL  Medical Precautions: Fall precautions  Post-Surgical Precautions: Abdominal surgery precautions  Precautions Comment: Educated pt. on abdominal precautions and log roll technique to promote wound healing    Vital Signs:  Vital Signs  Heart Rate: 53  Heart Rate Source: Monitor  Pulse Ox: 97 %  BP: 109/90  MAP (mmHg): 94  BP Location: Left arm  BP Method: Automatic  Patient Position: Lying    Objective   Pain:  Pain Assessment  Pain Assessment: 0-10  Pain Score: 8  Pain Type: Acute pain  Pain Location: Abdomen  Pain Orientation: Mid    Cognition:  Cognition  Overall Cognitive Status: Within Functional " Limits  Orientation Level: Oriented X4 (With prompting, pt. originally state month was March)    General Assessments:  Activity Tolerance  Endurance: Tolerates 10 - 20 min exercise with multiple rests    Sensation  Sensation Comment: Pt. endorsing diminished sensation in L thigh    Static Sitting Balance  Static Sitting-Comment/Number of Minutes: SBA  Dynamic Sitting Balance  Dynamic Sitting-Comments: SBA    Static Standing Balance  Static Standing-Comment/Number of Minutes: SBA  Dynamic Standing Balance  Dynamic Standing-Comments: CGA    Functional Assessments:  Bed Mobility  Bed Mobility: Yes  Bed Mobility 1  Bed Mobility 1: Supine to sitting  Level of Assistance 1: Close supervision  Bed Mobility Comments 1: cues for log roll, requires increased time to cmplete 2/2 abdominal pain    Bed Mobility 2  Bed Mobility  2: Sitting to supine  Level of Assistance 2: Close supervision    Transfers  Transfer: Yes  Transfer 1  Transfer From 1: Sit to, Stand to  Transfer to 1: Stand, Sit  Technique 1: Sit to stand, Stand to sit  Transfer Device 1:  (none)  Transfer Level of Assistance 1: Close supervision    Ambulation/Gait Training  Ambulation/Gait Training Performed: Yes  Ambulation/Gait Training 1  Surface 1: Level tile  Device 1: No device  Assistance 1:  (CGAx1 initially though pt. had x2 LOB requiring minAx1 to regain)  Quality of Gait 1: Decreased step length, Antalgic (x2 instances of L knee buckling resulting in instability; decreased waldo)  Comments/Distance (ft) 1: 50 ft    Stairs  Stairs: No    Extremity/Trunk Assessments:  RLE   RLE : Within Functional Limits  LLE   LLE : Within Functional Limits    Treatments:  Therapeutic Activity  Therapeutic Activity Performed: Yes  Therapeutic Activity 1: Pt. completed x20 alternating marches with HHA on R. Pt. demos minor instability in SLS though no acute LOB.  Therapeutic Activity 2: Brief seated rest break provided following ambulation trial and prior to completing  timed 5xSTS  Therapeutic Activity 3: Completed 5xSTS and Tinetti balance assessent, refer to outcome measures.    Ambulation/Gait Training  Ambulation/Gait Training Performed: Yes  Ambulation/Gait Training 1  Surface 1: Level tile  Device 1: No device  Assistance 1:  (CGAx1 initially though pt. had x2 LOB requiring minAx1 to regain)  Quality of Gait 1: Decreased step length, Antalgic (x2 instances of L knee buckling resulting in instability; decreased waldo)  Comments/Distance (ft) 1: 50 ft    Transfers 2  Transfer From 2: Chair with arms to, Stand to  Transfer to 2: Stand, Chair with arms  Technique 2: Sit to stand, Stand to sit  Transfer Device 2:  (none)  Transfer Level of Assistance 2: Contact guard  Trials/Comments 2: Completed timed 5xSTS, refer to outcome measures for details    Outcome Measures:  [facility] Basic Mobility  Turning from your back to your side while in a flat bed without using bedrails: A little  Moving from lying on your back to sitting on the side of a flat bed without using bedrails: A little  Moving to and from bed to chair (including a wheelchair): A little  Standing up from a chair using your arms (e.g. wheelchair or bedside chair): A little  To walk in hospital room: A little  Climbing 3-5 steps with railing: A lot  Basic Mobility - Total Score: 17    Tinetti  Sitting Balance: Steady, safe  Arises: Able, uses arms to help  Attempts to Arise: Able, requires more than one attempt  Immediate Standing Balance (First 5 Seconds): Steady but uses walker or other support  Standing Balance: Steady but wide stance, uses cane or other support  Nudged: Begins to fall  Eyes Closed: Unsteady  Turned 360 Degrees: Steadiness: Unsteady (Grabs, staggers)  Turned 360 Degrees: Continuity of Steps: Continuous  Sitting Down: Uses arms or not a smooth motion  Balance Score: 7  Initiation of Gait: No hesitancy  Step Height: R Swing Foot: Right foot complete clears floor  Step Length: R Swing Foot: Does not pass  left stance foot with step  Step Height: L Swing Foot: Left foot complete clears floor  Step Length: L Swing Foot: Does not pass right stance foot with step  Step Symmetry: Right and left step appear equal  Step Continuity: Stopping or discontinuity between steps  Path: Mild/moderate deviation or uses walking aid  Trunk: No sway but flexion of knees or back or spreads arms out while walking  Walking Time: Heels apart  Gait Score: 6  Total Score: 13  Pt. scored 13/28 on Tinetti Balance Assessment. A score of </=18 indicates high falls risk, 19-23 indicates moderate falls risk, and >/= 24 indicates low risk for falls     Other Measures  5x Sit to Stand: 23 seconds  Patient completed 5xSTS in 23 seconds with use of UEs. A time of >12 seconds indicates increased risk for falls. Pt. unable to complete initial transfer attempt without UE assist.      Encounter Problems       Encounter Problems (Active)       Balance       Pt will score >/= 24/28 on Tinetti balance assessment to indicate low risk for falls       Start:  04/15/24    Expected End:  04/29/24               Mobility       STG - Patient will ambulate >/= 200 ft Adarsh with LRAD and no evidence of instability       Start:  04/15/24    Expected End:  04/29/24            Patient to ascend/descend >/= 5 stairs with HR and SBA to demo ability to safely traverse stairs within community       Start:  04/15/24    Expected End:  04/29/24               PT Transfers       STG - Patient will perform bed mobility Adarsh with HOB flat and no rails       Start:  04/15/24    Expected End:  04/29/24            STG - Patient will transfer sit to and from stand Adarsh with LRAD       Start:  04/15/24    Expected End:  04/29/24                   Education Documentation  Precautions, taught by Mary Ann Chavez, PT at 4/15/2024  3:41 PM.  Learner: Patient  Readiness: Acceptance  Method: Explanation, Demonstration  Response: Verbalizes Understanding, Needs Reinforcement    Body Mechanics,  taught by Mary Ann Chavez, PT at 4/15/2024  3:41 PM.  Learner: Patient  Readiness: Acceptance  Method: Explanation, Demonstration  Response: Verbalizes Understanding, Needs Reinforcement    Mobility Training, taught by Mary Ann Chavez PT at 4/15/2024  3:41 PM.  Learner: Patient  Readiness: Acceptance  Method: Explanation, Demonstration  Response: Verbalizes Understanding, Needs Reinforcement    Education Comments  No comments found.       normal (ped)...

## 2025-03-17 NOTE — CARE PLAN
The patient's goals for the shift include pt will report decreased dizziness -progressing    The clinical goals for the shift include pt will remain free from falls during shift -met         Quality 226: Preventive Care And Screening: Tobacco Use: Screening And Cessation Intervention: Patient screened for tobacco use and is an ex/non-smoker Detail Level: Generalized